# Patient Record
Sex: FEMALE | Race: WHITE | NOT HISPANIC OR LATINO | Employment: UNEMPLOYED | URBAN - METROPOLITAN AREA
[De-identification: names, ages, dates, MRNs, and addresses within clinical notes are randomized per-mention and may not be internally consistent; named-entity substitution may affect disease eponyms.]

---

## 2017-07-26 ENCOUNTER — HOSPITAL ENCOUNTER (EMERGENCY)
Facility: HOSPITAL | Age: 42
Discharge: HOME | End: 2017-07-26
Admitting: EMERGENCY MEDICINE
Payer: COMMERCIAL

## 2017-07-26 VITALS
RESPIRATION RATE: 16 BRPM | WEIGHT: 264.8 LBS | DIASTOLIC BLOOD PRESSURE: 76 MMHG | OXYGEN SATURATION: 96 % | TEMPERATURE: 98 F | HEIGHT: 66 IN | SYSTOLIC BLOOD PRESSURE: 158 MMHG | HEART RATE: 87 BPM | BODY MASS INDEX: 42.56 KG/M2

## 2017-07-26 DIAGNOSIS — G43.009 MIGRAINE WITHOUT AURA AND WITHOUT STATUS MIGRAINOSUS, NOT INTRACTABLE: Primary | ICD-10-CM

## 2017-07-26 PROCEDURE — 99283 EMERGENCY DEPT VISIT LOW MDM: CPT

## 2017-07-26 RX ORDER — ONDANSETRON 4 MG/1
4 TABLET, ORALLY DISINTEGRATING ORAL ONCE
Status: COMPLETED | OUTPATIENT
Start: 2017-07-26 | End: 2017-07-26

## 2017-07-26 RX ORDER — IBUPROFEN 600 MG/1
600 TABLET ORAL ONCE
Status: COMPLETED | OUTPATIENT
Start: 2017-07-26 | End: 2017-07-26

## 2017-07-26 RX ADMIN — IBUPROFEN 600 MG: 600 TABLET ORAL at 08:40

## 2017-07-26 RX ADMIN — ONDANSETRON 4 MG: 4 TABLET, ORALLY DISINTEGRATING ORAL at 08:40

## 2017-10-02 ENCOUNTER — TRANSCRIBE ORDERS (OUTPATIENT)
Dept: SLEEP CENTER | Facility: CLINIC | Age: 42
End: 2017-10-02

## 2017-10-02 DIAGNOSIS — G47.33 OSA (OBSTRUCTIVE SLEEP APNEA): Primary | ICD-10-CM

## 2017-10-19 ENCOUNTER — TRANSCRIBE ORDERS (OUTPATIENT)
Dept: SLEEP CENTER | Facility: CLINIC | Age: 42
End: 2017-10-19

## 2017-10-19 DIAGNOSIS — G47.33 OSA (OBSTRUCTIVE SLEEP APNEA): Primary | ICD-10-CM

## 2017-11-10 ENCOUNTER — APPOINTMENT (EMERGENCY)
Dept: RADIOLOGY | Facility: HOSPITAL | Age: 42
DRG: 197 | End: 2017-11-10
Payer: COMMERCIAL

## 2017-11-10 ENCOUNTER — APPOINTMENT (EMERGENCY)
Dept: NON INVASIVE DIAGNOSTICS | Facility: HOSPITAL | Age: 42
DRG: 197 | End: 2017-11-10
Payer: COMMERCIAL

## 2017-11-10 ENCOUNTER — APPOINTMENT (EMERGENCY)
Dept: ULTRASOUND IMAGING | Facility: HOSPITAL | Age: 42
DRG: 197 | End: 2017-11-10
Payer: COMMERCIAL

## 2017-11-10 ENCOUNTER — HOSPITAL ENCOUNTER (INPATIENT)
Facility: HOSPITAL | Age: 42
LOS: 5 days | Discharge: HOME | DRG: 197 | End: 2017-11-15
Attending: EMERGENCY MEDICINE | Admitting: ANESTHESIOLOGY
Payer: COMMERCIAL

## 2017-11-10 DIAGNOSIS — R77.8 ELEVATED TROPONIN: ICD-10-CM

## 2017-11-10 DIAGNOSIS — S42.301A RIGHT HUMERAL FRACTURE: ICD-10-CM

## 2017-11-10 DIAGNOSIS — I26.99 PULMONARY EMBOLUS (HCC): Primary | ICD-10-CM

## 2017-11-10 LAB
ALBUMIN SERPL BCP-MCNC: 2.8 G/DL (ref 3.5–5)
ALP SERPL-CCNC: 94 U/L (ref 46–116)
ALT SERPL W P-5'-P-CCNC: 16 U/L (ref 12–78)
ANION GAP SERPL CALCULATED.3IONS-SCNC: 12 MMOL/L (ref 4–13)
APTT PPP: 105 SECONDS (ref 23–35)
APTT PPP: 22 SECONDS (ref 23–35)
AST SERPL W P-5'-P-CCNC: 10 U/L (ref 5–45)
ATRIAL RATE: 123 BPM
BASOPHILS # BLD AUTO: 0.05 THOUSANDS/ΜL (ref 0–0.1)
BASOPHILS NFR BLD AUTO: 0 % (ref 0–1)
BILIRUB SERPL-MCNC: 0.2 MG/DL (ref 0.2–1)
BUN SERPL-MCNC: 12 MG/DL (ref 5–25)
CALCIUM SERPL-MCNC: 8.7 MG/DL (ref 8.3–10.1)
CHLORIDE SERPL-SCNC: 103 MMOL/L (ref 100–108)
CO2 SERPL-SCNC: 23 MMOL/L (ref 21–32)
CREAT SERPL-MCNC: 0.82 MG/DL (ref 0.6–1.3)
EOSINOPHIL # BLD AUTO: 0.18 THOUSAND/ΜL (ref 0–0.61)
EOSINOPHIL NFR BLD AUTO: 1 % (ref 0–6)
ERYTHROCYTE [DISTWIDTH] IN BLOOD BY AUTOMATED COUNT: 14.8 % (ref 11.6–15.1)
GFR SERPL CREATININE-BSD FRML MDRD: 88 ML/MIN/1.73SQ M
GLUCOSE SERPL-MCNC: 179 MG/DL (ref 65–140)
HCT VFR BLD AUTO: 41.6 % (ref 34.8–46.1)
HGB BLD-MCNC: 13.6 G/DL (ref 11.5–15.4)
INR PPP: 0.94 (ref 0.86–1.16)
LYMPHOCYTES # BLD AUTO: 2.73 THOUSANDS/ΜL (ref 0.6–4.47)
LYMPHOCYTES NFR BLD AUTO: 14 % (ref 14–44)
MCH RBC QN AUTO: 27.7 PG (ref 26.8–34.3)
MCHC RBC AUTO-ENTMCNC: 32.7 G/DL (ref 31.4–37.4)
MCV RBC AUTO: 85 FL (ref 82–98)
MONOCYTES # BLD AUTO: 1 THOUSAND/ΜL (ref 0.17–1.22)
MONOCYTES NFR BLD AUTO: 5 % (ref 4–12)
NEUTROPHILS # BLD AUTO: 15.6 THOUSANDS/ΜL (ref 1.85–7.62)
NEUTS SEG NFR BLD AUTO: 79 % (ref 43–75)
NRBC BLD AUTO-RTO: 0 /100 WBCS
NT-PROBNP SERPL-MCNC: 689 PG/ML
P AXIS: 57 DEGREES
PLATELET # BLD AUTO: 266 THOUSANDS/UL (ref 149–390)
PMV BLD AUTO: 10.1 FL (ref 8.9–12.7)
POTASSIUM SERPL-SCNC: 4.1 MMOL/L (ref 3.5–5.3)
PR INTERVAL: 148 MS
PROT SERPL-MCNC: 7.5 G/DL (ref 6.4–8.2)
PROTHROMBIN TIME: 12.8 SECONDS (ref 12.1–14.4)
QRS AXIS: 16 DEGREES
QRSD INTERVAL: 70 MS
QT INTERVAL: 320 MS
QTC INTERVAL: 458 MS
RBC # BLD AUTO: 4.91 MILLION/UL (ref 3.81–5.12)
SODIUM SERPL-SCNC: 138 MMOL/L (ref 136–145)
T WAVE AXIS: 49 DEGREES
TROPONIN I SERPL-MCNC: 0.17 NG/ML
TROPONIN I SERPL-MCNC: 0.22 NG/ML
TROPONIN I SERPL-MCNC: 0.39 NG/ML
VENTRICULAR RATE: 123 BPM
WBC # BLD AUTO: 19.86 THOUSAND/UL (ref 4.31–10.16)

## 2017-11-10 PROCEDURE — 85025 COMPLETE CBC W/AUTO DIFF WBC: CPT | Performed by: EMERGENCY MEDICINE

## 2017-11-10 PROCEDURE — 93970 EXTREMITY STUDY: CPT

## 2017-11-10 PROCEDURE — 71010 HB CHEST X-RAY 1 VIEW FRONTAL (PORTABLE): CPT

## 2017-11-10 PROCEDURE — 80053 COMPREHEN METABOLIC PANEL: CPT | Performed by: EMERGENCY MEDICINE

## 2017-11-10 PROCEDURE — 85730 THROMBOPLASTIN TIME PARTIAL: CPT | Performed by: EMERGENCY MEDICINE

## 2017-11-10 PROCEDURE — 93005 ELECTROCARDIOGRAM TRACING: CPT | Performed by: EMERGENCY MEDICINE

## 2017-11-10 PROCEDURE — 83880 ASSAY OF NATRIURETIC PEPTIDE: CPT | Performed by: EMERGENCY MEDICINE

## 2017-11-10 PROCEDURE — 99285 EMERGENCY DEPT VISIT HI MDM: CPT

## 2017-11-10 PROCEDURE — 85730 THROMBOPLASTIN TIME PARTIAL: CPT | Performed by: ANESTHESIOLOGY

## 2017-11-10 PROCEDURE — 36415 COLL VENOUS BLD VENIPUNCTURE: CPT | Performed by: EMERGENCY MEDICINE

## 2017-11-10 PROCEDURE — 96366 THER/PROPH/DIAG IV INF ADDON: CPT

## 2017-11-10 PROCEDURE — 96375 TX/PRO/DX INJ NEW DRUG ADDON: CPT

## 2017-11-10 PROCEDURE — 96365 THER/PROPH/DIAG IV INF INIT: CPT

## 2017-11-10 PROCEDURE — 85610 PROTHROMBIN TIME: CPT | Performed by: EMERGENCY MEDICINE

## 2017-11-10 PROCEDURE — 84484 ASSAY OF TROPONIN QUANT: CPT | Performed by: NURSE PRACTITIONER

## 2017-11-10 PROCEDURE — 84484 ASSAY OF TROPONIN QUANT: CPT | Performed by: EMERGENCY MEDICINE

## 2017-11-10 PROCEDURE — 93306 TTE W/DOPPLER COMPLETE: CPT

## 2017-11-10 RX ORDER — CLONAZEPAM 0.5 MG/1
0.5 TABLET ORAL DAILY
Status: DISCONTINUED | OUTPATIENT
Start: 2017-11-11 | End: 2017-11-15 | Stop reason: HOSPADM

## 2017-11-10 RX ORDER — CLONAZEPAM 0.5 MG/1
0.5 TABLET ORAL DAILY
COMMUNITY
End: 2020-08-03 | Stop reason: SDUPTHER

## 2017-11-10 RX ORDER — HEPARIN SODIUM 1000 [USP'U]/ML
9600 INJECTION, SOLUTION INTRAVENOUS; SUBCUTANEOUS ONCE
Status: COMPLETED | OUTPATIENT
Start: 2017-11-10 | End: 2017-11-10

## 2017-11-10 RX ORDER — LOSARTAN POTASSIUM 50 MG/1
50 TABLET ORAL 2 TIMES DAILY
Status: DISCONTINUED | OUTPATIENT
Start: 2017-11-10 | End: 2017-11-15 | Stop reason: HOSPADM

## 2017-11-10 RX ORDER — AMOXICILLIN AND CLAVULANATE POTASSIUM 500; 125 MG/1; MG/1
1 TABLET, FILM COATED ORAL EVERY 12 HOURS SCHEDULED
COMMUNITY
End: 2018-02-23 | Stop reason: ALTCHOICE

## 2017-11-10 RX ORDER — LOSARTAN POTASSIUM 25 MG/1
25 TABLET ORAL 2 TIMES DAILY
Status: DISCONTINUED | OUTPATIENT
Start: 2017-11-10 | End: 2017-11-10

## 2017-11-10 RX ORDER — HEPARIN SODIUM 10000 [USP'U]/100ML
3-30 INJECTION, SOLUTION INTRAVENOUS
Status: DISCONTINUED | OUTPATIENT
Start: 2017-11-10 | End: 2017-11-15 | Stop reason: HOSPADM

## 2017-11-10 RX ORDER — CHLORHEXIDINE GLUCONATE 0.12 MG/ML
15 RINSE ORAL EVERY 12 HOURS SCHEDULED
Status: CANCELLED | OUTPATIENT
Start: 2017-11-10

## 2017-11-10 RX ORDER — AMOXICILLIN AND CLAVULANATE POTASSIUM 500; 125 MG/1; MG/1
1 TABLET, FILM COATED ORAL EVERY 12 HOURS SCHEDULED
Status: DISCONTINUED | OUTPATIENT
Start: 2017-11-10 | End: 2017-11-15 | Stop reason: HOSPADM

## 2017-11-10 RX ORDER — HEPARIN SODIUM 1000 [USP'U]/ML
9600 INJECTION, SOLUTION INTRAVENOUS; SUBCUTANEOUS AS NEEDED
Status: DISCONTINUED | OUTPATIENT
Start: 2017-11-10 | End: 2017-11-15 | Stop reason: HOSPADM

## 2017-11-10 RX ORDER — HEPARIN SODIUM 1000 [USP'U]/ML
4800 INJECTION, SOLUTION INTRAVENOUS; SUBCUTANEOUS AS NEEDED
Status: DISCONTINUED | OUTPATIENT
Start: 2017-11-10 | End: 2017-11-15 | Stop reason: HOSPADM

## 2017-11-10 RX ORDER — MELATONIN
1000 WEEKLY
COMMUNITY
End: 2019-02-24

## 2017-11-10 RX ORDER — SODIUM CHLORIDE 9 MG/ML
75 INJECTION, SOLUTION INTRAVENOUS CONTINUOUS
Status: DISCONTINUED | OUTPATIENT
Start: 2017-11-10 | End: 2017-11-14

## 2017-11-10 RX ORDER — CITALOPRAM 40 MG/1
40 TABLET ORAL DAILY
COMMUNITY
End: 2020-04-17 | Stop reason: SDUPTHER

## 2017-11-10 RX ORDER — LOSARTAN POTASSIUM 50 MG/1
50 TABLET ORAL 2 TIMES DAILY
Status: DISCONTINUED | OUTPATIENT
Start: 2017-11-11 | End: 2017-11-10

## 2017-11-10 RX ORDER — LOSARTAN POTASSIUM 50 MG/1
25 TABLET ORAL 2 TIMES DAILY
COMMUNITY
End: 2019-02-24

## 2017-11-10 RX ORDER — MELATONIN
1000 WEEKLY
Status: DISCONTINUED | OUTPATIENT
Start: 2017-11-10 | End: 2017-11-13

## 2017-11-10 RX ORDER — CITALOPRAM 20 MG/1
40 TABLET ORAL DAILY
Status: DISCONTINUED | OUTPATIENT
Start: 2017-11-11 | End: 2017-11-15 | Stop reason: HOSPADM

## 2017-11-10 RX ADMIN — AMOXICILLIN AND CLAVULANATE POTASSIUM 1 TABLET: 500; 125 TABLET, FILM COATED ORAL at 20:18

## 2017-11-10 RX ADMIN — HEPARIN SODIUM 9600 UNITS: 1000 INJECTION, SOLUTION INTRAVENOUS; SUBCUTANEOUS at 15:46

## 2017-11-10 RX ADMIN — LOSARTAN POTASSIUM 50 MG: 50 TABLET, FILM COATED ORAL at 20:45

## 2017-11-10 RX ADMIN — HEPARIN SODIUM AND DEXTROSE 18 UNITS/KG/HR: 10000; 5 INJECTION INTRAVENOUS at 15:48

## 2017-11-10 RX ADMIN — SODIUM CHLORIDE 75 ML/HR: 0.9 INJECTION, SOLUTION INTRAVENOUS at 19:58

## 2017-11-10 NOTE — ED PROVIDER NOTES
History  Chief Complaint   Patient presents with    Shortness of Breath     Pt brought via EMS for evaluation of SOB and CP  Pt reports SOB w/ exertion, and CP with inhilation  Pt had arm surgery 3 weeks prior  Also c/o left leg swelling, b/l leg weakness, and n/v  All symptoms started 2 days prior to arrival     Chest Pain       History provided by:  Patient  Shortness of Breath   Severity:  Moderate  Onset quality:  Gradual  Duration:  2 days  Timing:  Constant  Progression:  Worsening  Chronicity:  New  Context: activity    Context comment:  Pt with surgery for fixing humeral fracture and started with left leg pain/swelling and with some chest tightness/SOB in past few days  Relieved by:  None tried  Worsened by:  Exertion  Associated symptoms: chest pain    Associated symptoms: no sore throat, no sputum production, no vomiting and no wheezing    Risk factors: obesity, recent surgery and tobacco use    Chest Pain   Associated symptoms: shortness of breath    Associated symptoms: not vomiting        Prior to Admission Medications   Prescriptions Last Dose Informant Patient Reported? Taking?   amoxicillin-clavulanate (AUGMENTIN) 500-125 mg per tablet 11/10/2017 at Unknown time  Yes Yes   Sig: Take 1 tablet by mouth every 12 (twelve) hours   cholecalciferol (VITAMIN D3) 1,000 units tablet Past Week at Unknown time  Yes Yes   Sig: Take 1,000 Units by mouth once a week   citalopram (CeleXA) 40 mg tablet 11/10/2017 at Unknown time  Yes Yes   Sig: Take 40 mg by mouth daily   clonazePAM (KlonoPIN) 0 5 mg tablet 11/10/2017 at Unknown time  Yes Yes   Sig: Take 0 5 mg by mouth daily   losartan (COZAAR) 50 mg tablet 11/10/2017 at Unknown time  Yes Yes   Sig: Take 25 mg by mouth 2 (two) times a day      Facility-Administered Medications: None       Past Medical History:   Diagnosis Date    Anxiety     Depression     Hypertension        History reviewed  No pertinent surgical history  History reviewed   No pertinent family history  I have reviewed and agree with the history as documented  Social History   Substance Use Topics    Smoking status: Current Every Day Smoker     Packs/day: 0 50     Types: Cigarettes    Smokeless tobacco: Never Used    Alcohol use No        Review of Systems   HENT: Negative for sore throat  Respiratory: Positive for shortness of breath  Negative for sputum production and wheezing  Cardiovascular: Positive for chest pain  Gastrointestinal: Negative for vomiting  All other systems reviewed and are negative  Physical Exam  ED Triage Vitals   Temperature Pulse Respirations Blood Pressure SpO2   11/10/17 1250 11/10/17 1240 11/10/17 1240 11/10/17 1240 11/10/17 1240   98 °F (36 7 °C) (!) 126 21 137/70 95 %      Temp Source Heart Rate Source Patient Position - Orthostatic VS BP Location FiO2 (%)   11/10/17 1250 11/10/17 1240 11/10/17 1240 11/10/17 1240 --   Oral Monitor Sitting Left arm       Pain Score       --                  Orthostatic Vital Signs  Vitals:    11/10/17 1600 11/10/17 1630 11/10/17 1700 11/10/17 1730   BP: 135/77 133/81 122/87 131/64   Pulse: (!) 119 (!) 113 (!) 114 (!) 114   Patient Position - Orthostatic VS: Lying Lying  Lying       Physical Exam   Constitutional: She is oriented to person, place, and time  She appears well-developed and well-nourished  No distress  HENT:   Head: Normocephalic and atraumatic  Nose: Nose normal    Mouth/Throat: Oropharynx is clear and moist    Eyes: Conjunctivae and EOM are normal  Pupils are equal, round, and reactive to light  Neck: Normal range of motion  Neck supple  Cardiovascular: Regular rhythm, normal heart sounds and intact distal pulses  Tachycardia present  Pulmonary/Chest: Effort normal and breath sounds normal  No respiratory distress  Abdominal: Soft  Bowel sounds are normal    Musculoskeletal: Normal range of motion  She exhibits edema (left calf swelling)     Right arm in cast with ex-fix   Neurological: She is alert and oriented to person, place, and time  No cranial nerve deficit or sensory deficit  Skin: Skin is warm and dry  No rash noted  She is not diaphoretic  No erythema  Psychiatric: She has a normal mood and affect  Nursing note and vitals reviewed  ED Medications  Medications   heparin (porcine) 25,000 units in 250 mL infusion (premix) (18 Units/kg/hr × 120 kg (Order-Specific) Intravenous New Bag 11/10/17 1468)   heparin (porcine) injection 9,600 Units (not administered)   heparin (porcine) injection 4,800 Units (not administered)   heparin (porcine) injection 9,600 Units (9,600 Units Intravenous Given 11/10/17 5866)       Diagnostic Studies  Results Reviewed     Procedure Component Value Units Date/Time    APTT [[id number]]     Lab Status:  No result Specimen:  Blood     B-type natriuretic peptide [[id number]]  (Abnormal) Collected:  11/10/17 1258    Lab Status:  Final result Specimen:  Blood from Arm, Left Updated:  11/10/17 1329     NT-proBNP 689 (H) pg/mL     Troponin I [[id number]]  (Abnormal) Collected:  11/10/17 1258    Lab Status:  Final result Specimen:  Blood from Arm, Left Updated:  11/10/17 1325     Troponin I 0 39 (H) ng/mL     Narrative:         [employer] Chemistry analyzer 99% cutoff is > 0 04 ng/mL in network labs    o cTnI 99% cutoff is useful only when applied to patients in the clinical setting of myocardial ischemia  o cTnI 99% cutoff should be interpreted in the context of clinical history, ECG findings and possibly cardiac imaging to establish correct diagnosis  o cTnI 99% cutoff may be suggestive but clearly not indicative of a coronary event without the clinical setting of myocardial ischemia      Comprehensive metabolic panel [[id number]]  (Abnormal) Collected:  11/10/17 1258    Lab Status:  Final result Specimen:  Blood from Arm, Left Updated:  11/10/17 1322     Sodium 138 mmol/L      Potassium 4 1 mmol/L      Chloride 103 mmol/L      CO2 23 mmol/L      Anion Gap 12 mmol/L BUN 12 mg/dL      Creatinine 0 82 mg/dL      Glucose 179 (H) mg/dL      Calcium 8 7 mg/dL      AST 10 U/L      ALT 16 U/L      Alkaline Phosphatase 94 U/L      Total Protein 7 5 g/dL      Albumin 2 8 (L) g/dL      Total Bilirubin 0 20 mg/dL      eGFR 88 ml/min/1 73sq m     Narrative:         National Kidney Disease Education Program recommendations are as follows:  GFR calculation is accurate only with a steady state creatinine  Chronic Kidney disease less than 60 ml/min/1 73 sq  meters  Kidney failure less than 15 ml/min/1 73 sq  meters  Protime-INR [[id number]]  (Normal) Collected:  11/10/17 1259    Lab Status:  Final result Specimen:  Blood from Arm, Left Updated:  11/10/17 1322     Protime 12 8 seconds      INR 0 94    APTT [[id number]]  (Abnormal) Collected:  11/10/17 1259    Lab Status:  Final result Specimen:  Blood from Arm, Left Updated:  11/10/17 1322     PTT 22 (L) seconds     Narrative: Therapeutic Heparin Range = 60-90 seconds    CBC and differential [[id number]]  (Abnormal) Collected:  11/10/17 1258    Lab Status:  Final result Specimen:  Blood from Arm, Left Updated:  11/10/17 1310     WBC 19 86 (H) Thousand/uL      RBC 4 91 Million/uL      Hemoglobin 13 6 g/dL      Hematocrit 41 6 %      MCV 85 fL      MCH 27 7 pg      MCHC 32 7 g/dL      RDW 14 8 %      MPV 10 1 fL      Platelets 185 Thousands/uL      nRBC 0 /100 WBCs      Neutrophils Relative 79 (H) %      Lymphocytes Relative 14 %      Monocytes Relative 5 %      Eosinophils Relative 1 %      Basophils Relative 0 %      Neutrophils Absolute 15 60 (H) Thousands/µL      Lymphocytes Absolute 2 73 Thousands/µL      Monocytes Absolute 1 00 Thousand/µL      Eosinophils Absolute 0 18 Thousand/µL      Basophils Absolute 0 05 Thousands/µL                  XR chest 1 view portable   Final Result by Pauline Rodriguez DO (11/10 1423)      No acute pulmonary disease           Workstation performed: [id number]         VAS lower limb venous duplex study, complete bilateral    (Results Pending)              Procedures  ECG 12 Lead Documentation  Date/Time: 11/10/2017 2:36 PM  Performed by: Blanca Quintero  Authorized by: Blanca Quintero     Indications / Diagnosis:  Dyspnea  ECG reviewed by me, the ED Provider: yes    Patient location:  ED  Rate:     ECG rate:  123    ECG rate assessment: normal    Rhythm:     Rhythm: sinus tachycardia    ST segments:     ST segments:  Normal  T waves:     T waves: normal             Phone Contacts  ED Phone Contact    ED Course  ED Course                                MDM  Number of Diagnoses or Management Options  Elevated troponin: new and requires workup  Pulmonary embolus [facility]): new and requires workup     Amount and/or Complexity of Data Reviewed  Clinical lab tests: ordered and reviewed  Tests in the radiology section of CPT®: ordered and reviewed  Independent visualization of images, tracings, or specimens: yes    Risk of Complications, Morbidity, and/or Mortality  Presenting problems: high    Patient Progress  Patient progress: stable    CritCare Time    Disposition  Final diagnoses:   Pulmonary embolus ([facility] [address] )   Elevated troponin     Time reflects when diagnosis was documented in both MDM as applicable and the Disposition within this note     Time User Action Codes Description Comment    11/10/2017  5:26 PM Kate PLAZA Add [I26 99] Pulmonary embolus ([facility] [address] )     11/10/2017  5:27 PM Elkin Marcum [address], [address] [R74 8] Elevated troponin       ED Disposition     ED Disposition Condition Comment    Admit  Case was discussed with [facility] and the patient's admission status was agreed to be Admission Status: inpatient status to the service of   [facility]   Follow-up Information    None       Patient's Medications   Discharge Prescriptions    No medications on file     No discharge procedures on file      ED Provider  Electronically Signed by           Macy Ernst MD  11/10/17 5628

## 2017-11-11 PROBLEM — I26.99 PULMONARY EMBOLUS (HCC): Status: ACTIVE | Noted: 2017-11-11

## 2017-11-11 PROBLEM — S42.301A RIGHT HUMERAL FRACTURE: Status: ACTIVE | Noted: 2017-11-11

## 2017-11-11 PROBLEM — I82.409 ACUTE DEEP VEIN THROMBOSIS (DVT) OF LOWER EXTREMITY (HCC): Status: ACTIVE | Noted: 2017-11-11

## 2017-11-11 PROBLEM — F32.A DEPRESSION: Status: ACTIVE | Noted: 2017-11-11

## 2017-11-11 PROBLEM — Z72.0 TOBACCO ABUSE: Status: ACTIVE | Noted: 2017-11-11

## 2017-11-11 PROBLEM — I10 HTN (HYPERTENSION): Status: ACTIVE | Noted: 2017-11-11

## 2017-11-11 LAB
ALBUMIN SERPL BCP-MCNC: 2.3 G/DL (ref 3.5–5)
ALP SERPL-CCNC: 76 U/L (ref 46–116)
ALT SERPL W P-5'-P-CCNC: 13 U/L (ref 12–78)
ANION GAP SERPL CALCULATED.3IONS-SCNC: 9 MMOL/L (ref 4–13)
APTT PPP: 109 SECONDS (ref 23–35)
APTT PPP: 50 SECONDS (ref 23–35)
APTT PPP: 51 SECONDS (ref 23–35)
AST SERPL W P-5'-P-CCNC: 12 U/L (ref 5–45)
BASOPHILS # BLD AUTO: 0.08 THOUSANDS/ΜL (ref 0–0.1)
BASOPHILS NFR BLD AUTO: 1 % (ref 0–1)
BILIRUB SERPL-MCNC: 0.4 MG/DL (ref 0.2–1)
BUN SERPL-MCNC: 12 MG/DL (ref 5–25)
CALCIUM SERPL-MCNC: 8 MG/DL (ref 8.3–10.1)
CHLORIDE SERPL-SCNC: 105 MMOL/L (ref 100–108)
CO2 SERPL-SCNC: 26 MMOL/L (ref 21–32)
CREAT SERPL-MCNC: 0.85 MG/DL (ref 0.6–1.3)
EOSINOPHIL # BLD AUTO: 0.38 THOUSAND/ΜL (ref 0–0.61)
EOSINOPHIL NFR BLD AUTO: 3 % (ref 0–6)
ERYTHROCYTE [DISTWIDTH] IN BLOOD BY AUTOMATED COUNT: 15.2 % (ref 11.6–15.1)
GFR SERPL CREATININE-BSD FRML MDRD: 84 ML/MIN/1.73SQ M
GLUCOSE SERPL-MCNC: 140 MG/DL (ref 65–140)
HCT VFR BLD AUTO: 35.4 % (ref 34.8–46.1)
HGB BLD-MCNC: 11.3 G/DL (ref 11.5–15.4)
LYMPHOCYTES # BLD AUTO: 5.45 THOUSANDS/ΜL (ref 0.6–4.47)
LYMPHOCYTES NFR BLD AUTO: 37 % (ref 14–44)
MAGNESIUM SERPL-MCNC: 1.8 MG/DL (ref 1.6–2.6)
MCH RBC QN AUTO: 27.9 PG (ref 26.8–34.3)
MCHC RBC AUTO-ENTMCNC: 31.9 G/DL (ref 31.4–37.4)
MCV RBC AUTO: 87 FL (ref 82–98)
MONOCYTES # BLD AUTO: 0.72 THOUSAND/ΜL (ref 0.17–1.22)
MONOCYTES NFR BLD AUTO: 5 % (ref 4–12)
NEUTROPHILS # BLD AUTO: 7.99 THOUSANDS/ΜL (ref 1.85–7.62)
NEUTS SEG NFR BLD AUTO: 54 % (ref 43–75)
NRBC BLD AUTO-RTO: 0 /100 WBCS
PLATELET # BLD AUTO: 243 THOUSANDS/UL (ref 149–390)
PMV BLD AUTO: 10.6 FL (ref 8.9–12.7)
POTASSIUM SERPL-SCNC: 3.8 MMOL/L (ref 3.5–5.3)
PROT SERPL-MCNC: 6.3 G/DL (ref 6.4–8.2)
RBC # BLD AUTO: 4.05 MILLION/UL (ref 3.81–5.12)
SODIUM SERPL-SCNC: 140 MMOL/L (ref 136–145)
TROPONIN I SERPL-MCNC: 0.14 NG/ML
WBC # BLD AUTO: 14.86 THOUSAND/UL (ref 4.31–10.16)

## 2017-11-11 PROCEDURE — 85730 THROMBOPLASTIN TIME PARTIAL: CPT | Performed by: NURSE PRACTITIONER

## 2017-11-11 PROCEDURE — 83735 ASSAY OF MAGNESIUM: CPT | Performed by: NURSE PRACTITIONER

## 2017-11-11 PROCEDURE — 84484 ASSAY OF TROPONIN QUANT: CPT | Performed by: NURSE PRACTITIONER

## 2017-11-11 PROCEDURE — 80053 COMPREHEN METABOLIC PANEL: CPT | Performed by: NURSE PRACTITIONER

## 2017-11-11 PROCEDURE — 93005 ELECTROCARDIOGRAM TRACING: CPT | Performed by: NURSE PRACTITIONER

## 2017-11-11 PROCEDURE — 85025 COMPLETE CBC W/AUTO DIFF WBC: CPT | Performed by: NURSE PRACTITIONER

## 2017-11-11 PROCEDURE — 85730 THROMBOPLASTIN TIME PARTIAL: CPT | Performed by: INTERNAL MEDICINE

## 2017-11-11 RX ADMIN — SODIUM CHLORIDE 75 ML/HR: 0.9 INJECTION, SOLUTION INTRAVENOUS at 20:29

## 2017-11-11 RX ADMIN — HEPARIN SODIUM 4800 UNITS: 1000 INJECTION, SOLUTION INTRAVENOUS; SUBCUTANEOUS at 12:52

## 2017-11-11 RX ADMIN — HEPARIN SODIUM AND DEXTROSE 18 UNITS/KG/HR: 10000; 5 INJECTION INTRAVENOUS at 20:23

## 2017-11-11 RX ADMIN — HEPARIN SODIUM AND DEXTROSE 16 UNITS/KG/HR: 10000; 5 INJECTION INTRAVENOUS at 03:55

## 2017-11-11 RX ADMIN — CITALOPRAM HYDROBROMIDE 40 MG: 20 TABLET ORAL at 08:06

## 2017-11-11 RX ADMIN — AMOXICILLIN AND CLAVULANATE POTASSIUM 1 TABLET: 500; 125 TABLET, FILM COATED ORAL at 08:07

## 2017-11-11 RX ADMIN — SODIUM CHLORIDE 75 ML/HR: 0.9 INJECTION, SOLUTION INTRAVENOUS at 08:19

## 2017-11-11 RX ADMIN — HEPARIN SODIUM AND DEXTROSE 18 UNITS/KG/HR: 10000; 5 INJECTION INTRAVENOUS at 19:13

## 2017-11-11 RX ADMIN — LOSARTAN POTASSIUM 50 MG: 50 TABLET, FILM COATED ORAL at 08:06

## 2017-11-11 RX ADMIN — CLONAZEPAM 0.5 MG: 0.5 TABLET ORAL at 08:06

## 2017-11-11 RX ADMIN — AMOXICILLIN AND CLAVULANATE POTASSIUM 1 TABLET: 500; 125 TABLET, FILM COATED ORAL at 20:25

## 2017-11-11 RX ADMIN — HEPARIN SODIUM 4800 UNITS: 1000 INJECTION, SOLUTION INTRAVENOUS; SUBCUTANEOUS at 19:13

## 2017-11-11 RX ADMIN — LOSARTAN POTASSIUM 50 MG: 50 TABLET, FILM COATED ORAL at 17:59

## 2017-11-11 NOTE — CASE MANAGEMENT
[address] [facility] in the [facility] by [address] for 2017  Network Utilization Review Department  Phone: [phone]; Fax [phone]  ATTENTION: The Network Utilization Review Department is now centralized for our 7 Facilities  Make a note that we have a new phone and fax numbers for our Department  Please call with any questions or concerns to [phone] and carefully follow the prompts so that you are directed to the right person  All voicemails are confidential  Fax any determinations, approvals, denials, and requests for initial or continue stay review clinical to [phone]  Due to HIGH CALL volume, it would be easier if you could please send faxed requests to expedite your requests and in part, help us provide discharge notifications faster  Initial Clinical Review    Admission: Date/Time/Statement: 11/10/17 @ 1728     Orders Placed This Encounter   Procedures    Inpatient Admission (expected length of stay for this patient is greater than two midnights)     Standing Status:   Standing     Number of Occurrences:   1     Order Specific Question:   Admitting Physician     Answer:   Monserrat Cintron     Order Specific Question:   Level of Care     Answer:   Level 1 Stepdown [13]     Order Specific Question:   Estimated length of stay     Answer:   More than 2 Midnights     Order Specific Question:   Certification     Answer:   I certify that inpatient services are medically necessary for this patient for a duration of greater than two midnights  See H&P and MD Progress Notes for additional information about the patient's course of treatment       ED: Date/Time/Mode of Arrival:   ED Arrival Information     Expected Arrival Acuity Means of Arrival Escorted By Service Admission Type    - 11/10/2017 12:34 [facility] [address] Emergency    Arrival Complaint    SOB        Chief Complaint:   Chief Complaint   Patient presents with    Shortness of Breath     Pt brought via EMS for evaluation of SOB and CP  Pt reports SOB w/ exertion, and CP with inhilation  Pt had arm surgery 3 weeks prior  Also c/o left leg swelling, b/l leg weakness, and n/v  All symptoms started 2 days prior to arrival     Chest Pain     History of Illness: Joaquín Goyal is a 36 y o  female who presents with complaints of shortness of breath and chest pain  The patient underwent surgery for a fractured humerus 3 weeks ago and has now started having left leg pain and swelling with associated chest tightness and shortness of breath over the past few days  She has a past medical history of hypertension, depression, and anxiety  She currently has an external fixator on her right arm  Lower extremity doppler done in the ED showed a DVT, and an echo showed an EF of 55% and a dilated right ventricle    CT scan to formally rule out a PE could not be done given the fixator on her arm, however given the confirmed DVT,dilated right ventricle on echo, and her constellation of symptoms, she is being admitted to the stepdown unit and will be treated for a PE        ED Vital Signs:   ED Triage Vitals   Temperature Pulse Respirations Blood Pressure SpO2   11/10/17 1250 11/10/17 1240 11/10/17 1240 11/10/17 1240 11/10/17 1240   98 °F (36 7 °C) (!) 126 21 137/70 95 %      Temp Source Heart Rate Source Patient Position - Orthostatic VS BP Location FiO2 (%)   11/10/17 1250 11/10/17 1240 11/10/17 1240 11/10/17 1240 --   Oral Monitor Sitting Left arm       Pain Score       11/10/17 1943       4        Wt Readings from Last 1 Encounters:   11/11/17 123 kg (270 lb 1 oz)     Vital Signs (abnormal):   11/11/17 0400  --  85   26  117/67  86  92 %  None (Room air)  Lying   11/10/17 2319  97 7 °F (36 5 °C)  104   28  118/65  79  93 %  None (Room air)  Lying   11/10/17 1943  97 9 °F (36 6 °C)   107  16  110/57  80  96 %  None (Room air)  Lying   11/10/17 1830  --   112  16  110/63  --  97 % None (Room air)  Sitting   11/10/17 1800  --   114   11  111/58  --  97 %  None (Room air)  Sitting   11/10/17 1730  --   114  17  131/64  --  94 %  None (Room air)  Lying   11/10/17 1700  --   114  17  122/87  --  96 %  None (Room air)  --   11/10/17 1630  --   113  15  133/81  --  97 %  None (Room air)  Lying   11/10/17 1600  --   119  14  135/77  --  97 %  None (Room air)  Lying   11/10/17 1500  --   120  19  112/70  --  97 %  None (Room air)  Lying   11/10/17 1430  --   120  18  102/56  --  95 %  None (Room air)  Lying   11/10/17 1400  --   123  16  123/82  --  96 %  None (Room air)  Lying   11/10/17 1240  --   126  21  137/70  --  95 %  None (Room air)  Sitting     Abnormal Labs:  Glucose 179  Justin 8 0  Total protein 6 3  Albumin 2 8, 2 3  Trop - 0 39, 0 17, 0 22, 0 14    WBC - 19 86, 14 86  Hgb 11 3  PTT - 22, 105, 109, 50    Diagnostic Test Results:     VAS lower limb - Evidence of acute occlusive deep vein thrombosis within the proximal section of the gastroc veins  Thrombus protrudes into the juction with the popliteal vein  No evidence of superficial thrombophlebitis noted  EKG - Sinus tachycardia  Low voltage QRS  Cannot rule out Anterior infarct (cited on or before 10-NOV-2017)  Abnormal ECG    ED Treatment:   Medication Administration from 11/10/2017 1234 to 11/10/2017 1941       Date/Time Order Dose Route Action     11/10/2017 1546 heparin (porcine) injection 9,600 Units 9,600 Units Intravenous Given     11/10/2017 1548 heparin (porcine) 25,000 units in 250 mL infusion (premix) 18 Units/kg/hr Intravenous New Bag        Past Medical/Surgical History:    Active Ambulatory Problems     Diagnosis Date Noted    No Active Ambulatory Problems     Resolved Ambulatory Problems     Diagnosis Date Noted    No Resolved Ambulatory Problems     Past Medical History:   Diagnosis Date    Anxiety     Depression     Hypertension        Admitting Diagnosis: Pulmonary embolus ([facility] [address] ) [I26 99]  SOB (shortness of breath) [R06 02]  Elevated troponin [R74 8]    Age/Sex: 36 y o  female    Assessment/Plan:   1  LLE DVT  2  Presumed PE  3  Right humeral fracture s/p surgery with external fixator  4  HTN  5  Tobacco abuse  6  Depression      Plan: THe patient is a 36year old female s/p right humeral fracture repair 3 weeks ago with LLE DVT and presumed PE                  Neuro:   Depression-continue antidepressant  [facility] ICU  Sleep hygiene                 CV:   RV strain-2/2 PE, continue heparin drip  HTN-stable, continue losartan  NSTEMI type 2, 2/2 presumed PE-will trend troponins and follow EKG, on heparin drip                  Lung:   Presumed PE-unable to scan given external fixator, but high probability given confirmed LE DVT and RV strain seen on echo, along with her complaints of SOB and chest pain-heparin drip started, will need conversion to oral agent prior to discharge, monitor for hypoxia                   GI:   Cardiac diet                 FEN:   Monitor lytes and replace as needed                 : Monitor renal indices and I and Os                 ID:   Continue augmentin-was on post-operatively, monitor WBC and fever curve                 Heme:   Heparin drip, monitor coagulation studies and hemoglobin                 Endo:   Monitor glucose on daily BMP                 Msk/Skin:   Right humeral fracture s/p repair and external fixator-follows with [facility] as an outpatient, continue augmentin, pain relief as needed                 Disposition:   Will place in inpatient status as she will require a greater than 2 midnight LOS  She will be admitted to the stepdown unit  She will be a full code      VTE Pharmacologic Prophylaxis: Heparin  VTE Mechanical Prophylaxis: sequential compression device     Invasive lines and devices:       Invasive Devices            Peripheral Intravenous Line                     Peripheral IV 11/10/17 Left Hand less than 1 day                Code Status: No Order  Given critical illness, patient length of stay will require greater than two midnights  Admission Orders:  Scheduled Meds:   amoxicillin-clavulanate 1 tablet Oral Q12H [address]   cholecalciferol 1,000 Units Oral Weekly   citalopram 40 mg Oral Daily   clonazePAM 0 5 mg Oral Daily   losartan 50 mg Oral BID     Continuous Infusions:   heparin (porcine) 3-30 Units/kg/hr (Order-Specific) Last Rate: 16 Units/kg/hr (11/11/17 1240)   sodium chloride 75 mL/hr Last Rate: 75 mL/hr (11/11/17 0819)     PRN Meds:     heparin (porcine) x1    pneumococcal 23-valent polysaccharide vaccine    ICU  Diet Cardiac step 1  To MS now Tele  Nasal cannula   SCDs  Up with assist  PTT q 6 hrs  ________________________________________  11/11 Critical Care Progress Note  Assessment and Plan:   Principal Problem:    Acute deep vein thrombosis (DVT) of lower extremity (HCC)  Active Problems:    Presumed pulmonary embolus    HTN (hypertension)    Tobacco abuse    Depression    Right humeral fracture s/p repair with external fixator  Resolved Problems:    * No resolved hospital problems  *      Neuro:   Depression-continue antidepressant  [facility] ICU  Sleep hygiene     CV:   RV strain-2/2 PE, continue heparin drip  HTN-stable, continue losartan  NSTEMi type 2-2/2 presumed PE-continue to trend troponins and follow EKG, continue heparin drip     Pulm:   Presumed PE-unable to scan given external fixator, but high probability given confirmed LE DVT and RV strain seen on echo, along with her complaints of SOB and chest pain-continue heparin drip, monitor for hypoxia     GI:   Cardiac diet     :    Monitor renal indices and I and Os     F/E/N:   Monitor lytes and replace as needed     ID:   Continue augmentin-was on post-operatively, monitor WBC and fever curve     Heme:   Continue heparin drip     Endo:   Monitor glucose on daily BMP                Msk/Skin:   Right humeral fracture s/p repair and external fixator-follows with [facility] as an outpatient, continue augmentin, pain relief as needed     Disposition:   Maintain stepdown level of care     Code Status: Level 1 - Full Code  Chief Complaint: SOB  24 Hour Events:   Admitted yesterday with SOB and chest pain  Was found to have a LE DVT and RV strain on echo  Unable to scan for PE given the external fixator on her right arm    Heparin drip started for presumed PE

## 2017-11-11 NOTE — PROGRESS NOTES
Transfer Note - ICU Transfer to SD/MS tele   Paulina Lane 36 y o  female MRN: [id number]  [address] [facility]   Unit/Bed#:  Encounter: [id number]    Code Status: Level 1 - Full Code  POA:    POLST:      Reason for ICU adm:  Lower extremity DVT, possible pulmonary embolus    Active problems:   Principal Problem:    Acute deep vein thrombosis (DVT) of lower extremity (HCC)  Active Problems:    Presumed pulmonary embolus    HTN (hypertension)    Tobacco abuse    Depression    Right humeral fracture s/p repair with external fixator  Resolved Problems:    * No resolved hospital problems  *      Consultants:  None    History of Present Illness:  48-year-old female presents with complaints of shortness of breath and chest pain over past few days  Notable recent history of having external fixator placed on fractured right humerus 3 weeks ago for an injury that was sustained 2 years ago  Past medical history significant for hypertension, depression, anxiety  Summary of clinical course:  ER evaluation revealed lower extremity DVT  Echocardiogram revealed an ejection fraction of 55% and a dilated right ventricle  Patient was unable to have CT angio for PE given the fixator on her arm  Pulmonary embolus is presumed based on her constellation of symptoms and her right heart strain  She was admitted to step-down for same  Placed on anticoagulation  Augmentin continued from home medications  Prescribed postop  Recent or scheduled procedures:  None    Outstanding/pending diagnostics:  None    Cultures:  None       Mobilization Plan:  Out of bed as tolerated    Nutrition Plan:   Tolerating cardiac prudent diet    Discharge Plan:   Patient should be ready for discharge to home after the anticoagulation established    Initial Physical Therapy Recommendations:  Not applicable  Initial Occupational Therapy Recommendations:  Nonapplicable  Initial /Plan: Pending     Spoke with Dr Esther Scales  regarding transfer  Please call Critical Care Medicine with any questions or concerns  Portions of the record may have been created with voice recognition software  Occasional wrong word or "sound a like" substitutions may have occurred due to the inherent limitations of voice recognition software  Read the chart carefully and recognize, using context, where substitutions have occurred      PEGGY Dumas

## 2017-11-11 NOTE — H&P
History and Physical - Critical Care  Dali Lane 36 y o  female MRN: [id number]  Unit/Bed#: ED 24 Encounter: [id number]     Reason for Admission / Chief Complaint:  Shortness of breath     History of Present Illness:  Yeny Alvarez is a 36 y o  female who presents with complaints of shortness of breath and chest pain  The patient underwent surgery for a fractured humerus 3 weeks ago and has now started having left leg pain and swelling with associated chest tightness and shortness of breath over the past few days  She has a past medical history of hypertension, depression, and anxiety  She currently has an external fixator on her right arm  Lower extremity doppler done in the ED showed a DVT, and an echo showed an EF of 55% and a dilated right ventricle  CT scan to formally rule out a PE could not be done given the fixator on her arm, however given the confirmed DVT,dilated right ventricle on echo, and her constellation of symptoms, she is being admitted to the stepdown unit and will be treated for a PE  History obtained from the patient  Past Medical History:  Past Medical History:   Diagnosis Date    Anxiety     Depression     Hypertension         Past Surgical History:  History reviewed  No pertinent surgical history  Past Family History:  History reviewed  No pertinent family history       Social History:  History   Smoking Status    Current Every Day Smoker    Packs/day: 0 50    Types: Cigarettes   Smokeless Tobacco    Never Used     History   Alcohol Use No     History   Drug Use No     Marital Status: Single     Medications:  Current Facility-Administered Medications   Medication Dose Route Frequency    heparin (porcine) 25,000 units in 250 mL infusion (premix)  3-30 Units/kg/hr (Order-Specific) Intravenous Titrated    heparin (porcine) injection 4,800 Units  4,800 Units Intravenous PRN    heparin (porcine) injection 9,600 Units  9,600 Units Intravenous PRN     Home medications:  Prior to Admission medications    Medication Sig Start Date End Date Taking? Authorizing Provider   amoxicillin-clavulanate (AUGMENTIN) 500-125 mg per tablet Take 1 tablet by mouth every 12 (twelve) hours   Yes Historical Provider, MD   cholecalciferol (VITAMIN D3) 1,000 units tablet Take 1,000 Units by mouth once a week   Yes Historical Provider, MD   citalopram (CeleXA) 40 mg tablet Take 40 mg by mouth daily   Yes Historical Provider, MD   clonazePAM (KlonoPIN) 0 5 mg tablet Take 0 5 mg by mouth daily   Yes Historical Provider, MD   losartan (COZAAR) 50 mg tablet Take 25 mg by mouth 2 (two) times a day   Yes Historical Provider, MD     Allergies: Allergies   Allergen Reactions    Buspar [Buspirone] Rash    Other Rash     Surgical tape        ROS:   Review of Systems   Constitutional: Negative for chills and fever  HENT: Negative  Eyes: Negative  Respiratory: Positive for shortness of breath  Negative for cough, wheezing and stridor  Cardiovascular: Positive for chest pain and leg swelling  Negative for palpitations  Left leg swelling   Gastrointestinal: Negative for abdominal distention, abdominal pain, blood in stool, diarrhea, nausea and vomiting  Endocrine: Negative  Genitourinary: Negative for dysuria, flank pain, frequency and urgency  Musculoskeletal: Negative  Skin: Negative for rash and wound  Allergic/Immunologic: Negative  Neurological: Negative for dizziness, syncope, facial asymmetry, weakness, light-headedness, numbness and headaches  Hematological: Negative for adenopathy  Does not bruise/bleed easily  Psychiatric/Behavioral: Negative           Vitals:  Vitals:    11/10/17 1700 11/10/17 1730 11/10/17 1800 11/10/17 1830   BP: 122/87 131/64 111/58 110/63   Pulse: (!) 114 (!) 114 (!) 114 (!) 112   Resp:  (!) 11 16   Temp:       TempSrc:       SpO2: 96% 94% 97% 97%   Weight:       Height:         Temperature:   Temp (24hrs), Av °F (36 7 °C), Min:98 °F (36 7 °C), Max:98 °F (36 7 °C)    Current: Temperature: 98 °F (36 7 °C)     Weights:   IBW: 59 3 kg  Body mass index is 42 77 kg/m²  Hemodynamic Monitoring:  N/A     Non-Invasive/Invasive Ventilation Settings:  Respiratory    Lab Data (Last 4 hours)    None         O2/Vent Data (Last 4 hours)    None              No results found for: PHART, ZZO1AIH, PO2ART, JYK7YLT, L7GUFXTN, BEART, SOURCE  SpO2: SpO2: 97 %     Physical Exam:  Physical Exam   Constitutional: She is oriented to person, place, and time  She appears well-developed and well-nourished  No distress  HENT:   Head: Normocephalic and atraumatic  Eyes: Conjunctivae are normal  Pupils are equal, round, and reactive to light  Neck: Normal range of motion  Neck supple  No JVD present  No thyromegaly present  Cardiovascular: Regular rhythm and normal heart sounds  Tachycardia present  Exam reveals no gallop and no friction rub  No murmur heard  Pulmonary/Chest: Effort normal and breath sounds normal  No respiratory distress  Abdominal: Soft  Bowel sounds are normal  She exhibits no distension  There is no tenderness  Genitourinary:   Genitourinary Comments: Deferred   Musculoskeletal: She exhibits no edema  +1 LLE edema    Right arm with external fixator   Lymphadenopathy:     She has no cervical adenopathy  Neurological: She is alert and oriented to person, place, and time  No cranial nerve deficit  Skin: Skin is warm and dry  No rash noted  No erythema  Psychiatric: She has a normal mood and affect          Labs:    Results from last 7 days  Lab Units 11/10/17  1258   WBC Thousand/uL 19 86*   HEMOGLOBIN g/dL 13 6   HEMATOCRIT % 41 6   PLATELETS Thousands/uL 266   NEUTROS PCT % 79*   MONOS PCT % 5      Results from last 7 days  Lab Units 11/10/17  1258   SODIUM mmol/L 138   POTASSIUM mmol/L 4 1   CHLORIDE mmol/L 103   CO2 mmol/L 23   BUN mg/dL 12   CREATININE mg/dL 0 82   CALCIUM mg/dL 8 7   TOTAL PROTEIN g/dL 7 5 BILIRUBIN TOTAL mg/dL 0 20   ALK PHOS U/L 94   ALT U/L 16   AST U/L 10   GLUCOSE RANDOM mg/dL 179*                Results from last 7 days  Lab Units 11/10/17  1259   INR  0 94   PTT seconds 22*           0  Lab Value Date/Time   TROPONINI 0 39 (H) 11/10/2017 1258        Imaging:   XR chest 1 view portable   Final Result      No acute pulmonary disease  Workstation performed: [id number]         VAS lower limb venous duplex study, complete bilateral    (Results Pending)      I have personally reviewed pertinent reports  and I have personally reviewed pertinent films in PACS  EKG: Sinus tach- This was personally reviewed by myself  Micro:  No results found for: Bubba Velasco, SPUTUMCULTUR    Assessment:   1  LLE DVT  2  Presumed PE  3  Right humeral fracture s/p surgery with external fixator  4  HTN  5  Tobacco abuse  6  Depression        Plan: THe patient is a 36year old female s/p right humeral fracture repair 3 weeks ago with LLE DVT and presumed PE                   Neuro:   Depression-continue antidepressant  [facility] ICU  Sleep hygiene                 CV:   RV strain-2/2 PE, continue heparin drip  HTN-stable, continue losartan  NSTEMI type 2, 2/2 presumed PE-will trend troponins and follow EKG, on heparin drip                   Lung:   Presumed PE-unable to scan given external fixator, but high probability given confirmed LE DVT and RV strain seen on echo, along with her complaints of SOB and chest pain-heparin drip started, will need conversion to oral agent prior to discharge, monitor for hypoxia                   GI:   Cardiac diet                 FEN:   Monitor lytes and replace as needed                 :   Monitor renal indices and I and Os                 ID:   Continue augmentin-was on post-operatively, monitor WBC and fever curve                 Heme:   Heparin drip, monitor coagulation studies and hemoglobin                 Endo:   Monitor glucose on daily BMP Msk/Skin:   Right humeral fracture s/p repair and external fixator-follows with [facility] as an outpatient, continue augmentin, pain relief as needed                 Disposition:   Will place in inpatient status as she will require a greater than 2 midnight LOS  She will be admitted to the stepdown unit  She will be a full code  VTE Pharmacologic Prophylaxis: Heparin  VTE Mechanical Prophylaxis: sequential compression device     Invasive lines and devices: Invasive Devices     Peripheral Intravenous Line            Peripheral IV 11/10/17 Left Hand less than 1 day                 Code Status: No Order  POA:    POLST:       Given critical illness, patient length of stay will require greater than two midnights  Counseling / Coordination of Care  Total Critical Care time spent 25 minutes excluding procedures, teaching and family updates  Portions of the record may have been created with voice recognition software  Occasional wrong word or "sound a like" substitutions may have occurred due to the inherent limitations of voice recognition software  Read the chart carefully and recognize, using context, where substitutions have occurred          Faizan Campbell

## 2017-11-11 NOTE — PROGRESS NOTES
Progress Note - Critical Care   Nonda Dandy Reddinger 36 y o  female MRN: [id number]  Unit/Bed#:  Encounter: [id number]    Attending Physician: Gene Lam MD      ______________________________________________________________________  Assessment and Plan:   Principal Problem:    Acute deep vein thrombosis (DVT) of lower extremity (HCC)  Active Problems:    Presumed pulmonary embolus    HTN (hypertension)    Tobacco abuse    Depression    Right humeral fracture s/p repair with external fixator  Resolved Problems:    * No resolved hospital problems  *        Neuro:   Depression-continue antidepressant  [facility] ICU  Sleep hygiene    CV:   RV strain-2/2 PE, continue heparin drip  HTN-stable, continue losartan  NSTEMi type 2-2/2 presumed PE-continue to trend troponins and follow EKG, continue heparin drip    Pulm:   Presumed PE-unable to scan given external fixator, but high probability given confirmed LE DVT and RV strain seen on echo, along with her complaints of SOB and chest pain-continue heparin drip, monitor for hypoxia    GI:   Cardiac diet    : Monitor renal indices and I and Os    F/E/N:   Monitor lytes and replace as needed    ID:   Continue augmentin-was on post-operatively, monitor WBC and fever curve    Heme:   Continue heparin drip    Endo:   Monitor glucose on daily BMP     Msk/Skin:   Right humeral fracture s/p repair and external fixator-follows with [facility] as an outpatient, continue augmentin, pain relief as needed    Disposition:   Maintain stepdown level of care    Code Status: Level 1 - Full Code  ______________________________________________________________________    Chief Complaint: SOB    24 Hour Events:   Admitted yesterday with SOB and chest pain  Was found to have a LE DVT and RV strain on echo  Unable to scan for PE given the external fixator on her right arm    Heparin drip started for presumed PE     ______________________________________________________________________  Physical Exam   Constitutional: Oriented to person, place, and time and well-developed, well-nourished, and in no acute distress  Head: Normocephalic and atraumatic  Eyes: Conjunctivae are normal  Pupils are equal, round, and reactive to light  Neck: Neck supple  No JVD present  Cardiovascular: Normal rate, regular rhythm and normal heart sounds  Exam reveals no gallop and no friction rub  No murmur heard  Pulmonary/Chest: Effort normal and breath sounds normal    Abdominal: Soft  Bowel sounds are normal  No distension  No tenderness  Genitourinary: Deferred   Musculoskeletal: Right arm with external fixator, +1 LLE edema   Lymphadenopathy:   No cervical adenopathy  Neurological: Alert and oriented to person, place, and time  No cranial nerve deficit  GCS score is 15  Skin: Skin is warm and dry  No rash noted  Psychiatric: Mood and affect normal        ______________________________________________________________________  Vitals:    11/10/17 1830 11/10/17 1943 11/10/17 2030 11/10/17 2319   BP: 110/63 110/57 115/61 118/65   Pulse: (!) 112 (!) 107 105 104   Resp: 16 16 20 (!) 28   Temp:  97 9 °F (36 6 °C)  97 7 °F (36 5 °C)   TempSrc:  Oral  Oral   SpO2: 97% 96% 95% 93%   Weight:  121 kg (267 lb 3 2 oz)     Height:           Temperature:   Temp (24hrs), Av 9 °F (36 6 °C), Min:97 7 °F (36 5 °C), Max:98 °F (36 7 °C)    Current Temperature: 97 7 °F (36 5 °C)  Weights:   IBW: 59 3 kg    Body mass index is 43 13 kg/m²    Weight (last 2 days)     Date/Time   Weight    11/10/17 1943  121 (267 2)    11/10/17 1240  120 (265)            Hemodynamic Monitoring:  N/A     Non-Invasive/Invasive Ventilation Settings:  Respiratory    Lab Data (Last 4 hours)    None         O2/Vent Data (Last 4 hours)    None              No results found for: PHART, GYR2LSQ, PO2ART, BMU8ITX, F9DGVEAI, BEART, SOURCE  SpO2: SpO2: 93 %  Intake and Outputs:  I/O       701 - 11/10 0700 11/10 0701 - 700    I V  (mL/kg)  334 3 (2 8)    Total Intake(mL/kg)  334 3 (2 8)    Net   +334 3                Nutrition:        Diet Orders            Start     Ordered    11/10/17 1942  Diet Cardiac; Cardiac Step 1  Diet effective now     Question Answer Comment   Diet Type Cardiac    Cardiac Cardiac Step 1    RD to adjust diet per protocol? Yes        11/10/17 1941          Labs:     Results from last 7 days  Lab Units 11/10/17  1258   WBC Thousand/uL 19 86*   HEMOGLOBIN g/dL 13 6   HEMATOCRIT % 41 6   PLATELETS Thousands/uL 266   NEUTROS PCT % 79*   MONOS PCT % 5       Results from last 7 days  Lab Units 11/10/17  1258   SODIUM mmol/L 138   POTASSIUM mmol/L 4 1   CHLORIDE mmol/L 103   CO2 mmol/L 23   BUN mg/dL 12   CREATININE mg/dL 0 82   CALCIUM mg/dL 8 7   TOTAL PROTEIN g/dL 7 5   BILIRUBIN TOTAL mg/dL 0 20   ALK PHOS U/L 94   ALT U/L 16   AST U/L 10   GLUCOSE RANDOM mg/dL 179*         No results found for: PHOS     Results from last 7 days  Lab Units 11/10/17  2248 11/10/17  1259   INR   --  0 94   PTT seconds 105* 22*       0  Lab Value Date/Time   TROPONINI 0 22 (H) 11/10/2017 2248   TROPONINI 0 17 (H) 11/10/2017 2043   TROPONINI 0 39 (H) 11/10/2017 1258         ABG:No results found for: PHART, VVZ5TXQ, PO2ART, SGH6ZVF, R8RSIYEA, BEART, SOURCE  Imaging:   XR chest 1 view portable   Final Result      No acute pulmonary disease  Workstation performed: [id number]         VAS lower limb venous duplex study, complete bilateral    (Results Pending)      I have personally reviewed pertinent reports  and I have personally reviewed pertinent films in PACS  EKG: NSR  Micro:  No results found for: Seretha Foster, SPUTUMCULTUR  Allergies:    Allergies   Allergen Reactions    Buspar [Buspirone] Rash    Other Rash     Surgical tape     Medications:   Scheduled Meds:  amoxicillin-clavulanate 1 tablet Oral Q12H [address]   cholecalciferol 1,000 Units Oral Weekly   citalopram 40 mg Oral Daily   clonazePAM 0 5 mg Oral Daily   losartan 50 mg Oral BID     Continuous Infusions:  heparin (porcine) 3-30 Units/kg/hr (Order-Specific) Last Rate: 16 Units/kg/hr (11/10/17 2322)   sodium chloride 75 mL/hr Last Rate: 75 mL/hr (11/10/17 1958)     PRN Meds:    heparin (porcine) 4,800 Units PRN   heparin (porcine) 9,600 Units PRN     VTE Pharmacologic Prophylaxis: Heparin Drip  VTE Mechanical Prophylaxis: sequential compression device  Invasive lines and devices: Invasive Devices     Peripheral Intravenous Line            Peripheral IV 11/10/17 Left Hand less than 1 day                     Portions of the record may have been created with voice recognition software  Occasional wrong word or "sound a like" substitutions may have occurred due to the inherent limitations of voice recognition software  Read the chart carefully and recognize, using context, where substitutions have occurred      Beau Warner

## 2017-11-11 NOTE — MALNUTRITION/BMI
This medical record reflects one or more clinical indicators suggestive of morbid obesity  Please indicate the one diagnosis below which you feel best reflects the clinical picture  BMI Findings:  40-44 9  Morbid obesity related to energy intake exceeding energy expenditure over time as evidenced by  Body mass index is 43 59 kg/m²  See Nutrition note dated 11/11/17 for additional details  Completed nutrition assessment is viewable in the nutrition documentation

## 2017-11-12 PROBLEM — E66.01 MORBID OBESITY (HCC): Status: ACTIVE | Noted: 2017-11-12

## 2017-11-12 LAB
APTT PPP: 101 SECONDS (ref 23–35)
APTT PPP: 55 SECONDS (ref 23–35)
APTT PPP: 61 SECONDS (ref 23–35)
APTT PPP: 74 SECONDS (ref 23–35)
INR PPP: 0.98 (ref 0.86–1.16)
PROTHROMBIN TIME: 13.2 SECONDS (ref 12.1–14.4)

## 2017-11-12 PROCEDURE — 93005 ELECTROCARDIOGRAM TRACING: CPT | Performed by: NURSE PRACTITIONER

## 2017-11-12 PROCEDURE — 85730 THROMBOPLASTIN TIME PARTIAL: CPT | Performed by: INTERNAL MEDICINE

## 2017-11-12 PROCEDURE — 85610 PROTHROMBIN TIME: CPT | Performed by: INTERNAL MEDICINE

## 2017-11-12 RX ORDER — GINSENG 100 MG
1 CAPSULE ORAL 2 TIMES WEEKLY
Status: DISCONTINUED | OUTPATIENT
Start: 2017-11-12 | End: 2017-11-15 | Stop reason: HOSPADM

## 2017-11-12 RX ORDER — WARFARIN SODIUM 5 MG/1
10 TABLET ORAL
Status: COMPLETED | OUTPATIENT
Start: 2017-11-12 | End: 2017-11-12

## 2017-11-12 RX ADMIN — LOSARTAN POTASSIUM 50 MG: 50 TABLET, FILM COATED ORAL at 08:03

## 2017-11-12 RX ADMIN — SODIUM CHLORIDE 75 ML/HR: 0.9 INJECTION, SOLUTION INTRAVENOUS at 08:10

## 2017-11-12 RX ADMIN — CLONAZEPAM 0.5 MG: 0.5 TABLET ORAL at 08:03

## 2017-11-12 RX ADMIN — BACITRACIN ZINC 1 SMALL APPLICATION: 500 OINTMENT TOPICAL at 17:23

## 2017-11-12 RX ADMIN — CITALOPRAM HYDROBROMIDE 40 MG: 20 TABLET ORAL at 08:03

## 2017-11-12 RX ADMIN — AMOXICILLIN AND CLAVULANATE POTASSIUM 1 TABLET: 500; 125 TABLET, FILM COATED ORAL at 21:00

## 2017-11-12 RX ADMIN — HEPARIN SODIUM AND DEXTROSE 16 UNITS/KG/HR: 10000; 5 INJECTION INTRAVENOUS at 08:09

## 2017-11-12 RX ADMIN — HEPARIN SODIUM AND DEXTROSE 18 UNITS/KG/HR: 10000; 5 INJECTION INTRAVENOUS at 16:16

## 2017-11-12 RX ADMIN — SODIUM CHLORIDE 75 ML/HR: 0.9 INJECTION, SOLUTION INTRAVENOUS at 21:23

## 2017-11-12 RX ADMIN — HEPARIN SODIUM AND DEXTROSE 18 UNITS/KG/HR: 10000; 5 INJECTION INTRAVENOUS at 21:19

## 2017-11-12 RX ADMIN — LOSARTAN POTASSIUM 50 MG: 50 TABLET, FILM COATED ORAL at 17:23

## 2017-11-12 RX ADMIN — WARFARIN SODIUM 10 MG: 5 TABLET ORAL at 17:22

## 2017-11-12 RX ADMIN — AMOXICILLIN AND CLAVULANATE POTASSIUM 1 TABLET: 500; 125 TABLET, FILM COATED ORAL at 08:03

## 2017-11-12 NOTE — SOCIAL WORK
Cm met with patient at bedside, patient alert and oriented during interview  Patient reports residing in a private home in [facility] with her SO and children  Patient reports being independent with ADL's, no dme use, vna or hx of str  Patient reports filling her prescriptions at [facility] with no co-payment barriers  Patient reports following up with her PCP as recommended has adequate transportation and reports having strong family and community supports  Patient declining referral to vna or resources for advanced directives  Patient reports no needs at this time cm team will continue to follow and assess

## 2017-11-12 NOTE — PROGRESS NOTES
[facility] Progress Note  Patient: Mima Lane 36 y o  female   MRN: [id number]  PCP: Peter Robbins DO  Unit/Bed#: -01 Encounter: [id number]  Date Of Visit: 11/12/17    Assessment/Plan:          · Acute DVT left lower leg  Major risk factor appears to be patient not being very ambulatory and morbidly obese in addition to having recent surgery  I am not sure if she was on any anti coagulant for DVT prophylaxis for her right arm surgery  On IV heparin  Would start her on Coumadin  Patient would need at least 48 hours overlapped with them IV heparin when INR is therapeutic or she has been on a IV heparin with therapeutic levels for about a week or so  · Morbid obesity  Major comorbid issue  Needs to work on her diet and exercise  · Essential hypertension  Continue with current treatment  · Presumed pulmonary embolus  For some reason, she could not get a CT scan because of her right arm hardware  Weight may be an issue  · Depression  Continue home medications  · Right humeral fracture status post repair with external fixator  Patient to follow up with her surgeon after discharge  · Tobacco abuse  Encouraged to quit      VTE Pharmacologic Prophylaxis:   Pharmacologic: Heparin Drip  Mechanical VTE Prophylaxis in Place: Only right leg    Patient Centered Rounds: I have performed bedside rounds with nursing staff today  Discussions with Specialists or Other Care Team Provider:  Yes    Education and Discussions with Family / Patient:  Yes    Time Spent for Care: 35+ minutes  More than 50% of total time spent on counseling and coordination of care as described above  Current Length of Stay: 2 day(s)    Current Patient Status: Inpatient   Certification Statement: The patient will continue to require additional inpatient hospital stay due to IV heparin/Coumadin    Discharge Plan:  Home when stable    Code Status: Level 1 - Full Code      Subjective:   Feels okay    Becomes short of breath with activity  No chest pain at rest   Denies any fever or chills  Denies any nausea or vomiting  Objective:     Vitals:   Temp (24hrs), Av 3 °F (36 8 °C), Min:97 6 °F (36 4 °C), Max:99 1 °F (37 3 °C)    HR:  [65-88] 65  Resp:  [18-19] 18  BP: (111-141)/(61-92) 116/61  SpO2:  [95 %-98 %] 98 %  Body mass index is 43 59 kg/m²  Input and Output Summary (last 24 hours): Intake/Output Summary (Last 24 hours) at 17 0946  Last data filed at 17 1831   Gross per 24 hour   Intake              220 ml   Output              200 ml   Net               20 ml           Physical Exam:     Vital signs reviewed as above  Patient is in bed  Not in any respiratory distress while at rest  Abdomen is obese  It is nontender  Bowel sounds are audible  Has external fixator right arm  S1 and S2 are audible  In sinus rhythm  Awake and alert  Oriented x3  Has no significant swelling of her legs  Oropharynx are moist and crowded  Conjunctivae slightly pale    Additional Data:     Labs:      Results from last 7 days  Lab Units 17  0507   WBC Thousand/uL 14 86*   HEMOGLOBIN g/dL 11 3*   HEMATOCRIT % 35 4   PLATELETS Thousands/uL 243   NEUTROS PCT % 54   LYMPHS PCT % 37   MONOS PCT % 5   EOS PCT % 3       Results from last 7 days  Lab Units 17  0507   SODIUM mmol/L 140   POTASSIUM mmol/L 3 8   CHLORIDE mmol/L 105   CO2 mmol/L 26   BUN mg/dL 12   CREATININE mg/dL 0 85   CALCIUM mg/dL 8 0*   TOTAL PROTEIN g/dL 6 3*   BILIRUBIN TOTAL mg/dL 0 40   ALK PHOS U/L 76   ALT U/L 13   AST U/L 12   GLUCOSE RANDOM mg/dL 140       Results from last 7 days  Lab Units 11/10/17  1259   INR  0 94       * I Have Reviewed All Lab Data Listed Above  * Additional Pertinent Lab Tests Reviewed:  All Labs Within Last 24 Hours Reviewed    I  Recent Cultures (last 7 days):           Last 24 Hours Medication List:     amoxicillin-clavulanate 1 tablet Oral Q12H [address]   cholecalciferol 1,000 Units Oral Weekly citalopram 40 mg Oral Daily   clonazePAM 0 5 mg Oral Daily   losartan 50 mg Oral BID   warfarin 10 mg Oral Once (warfarin)        Today, Patient Was Seen By: Corbin Roberto MD    ** Please Note: Dragon 360 Dictation voice to text software may have been used in the creation of this document   **

## 2017-11-12 NOTE — PLAN OF CARE

## 2017-11-12 NOTE — PLAN OF CARE
DISCHARGE PLANNING     Discharge to home or other facility with appropriate resources Progressing        INFECTION - ADULT     Absence or prevention of progression during hospitalization Progressing        Knowledge Deficit     Patient/family/caregiver demonstrates understanding of disease process, treatment plan, medications, and discharge instructions Progressing        PAIN - ADULT     Verbalizes/displays adequate comfort level or baseline comfort level Progressing        Potential for Falls     Patient will remain free of falls Progressing        Prexisting or High Potential for Compromised Skin Integrity     Skin integrity is maintained or improved Progressing        SAFETY ADULT     Maintain or return to baseline ADL function Progressing     Maintain or return mobility status to optimal level Progressing

## 2017-11-13 LAB
APTT PPP: 104 SECONDS (ref 23–35)
APTT PPP: 131 SECONDS (ref 23–35)
APTT PPP: 45 SECONDS (ref 23–35)
ATRIAL RATE: 61 BPM
ATRIAL RATE: 64 BPM
ATRIAL RATE: 80 BPM
INR PPP: 1.03 (ref 0.86–1.16)
P AXIS: 52 DEGREES
P AXIS: 53 DEGREES
P AXIS: 64 DEGREES
PR INTERVAL: 150 MS
PR INTERVAL: 150 MS
PR INTERVAL: 154 MS
PROTHROMBIN TIME: 13.7 SECONDS (ref 12.1–14.4)
QRS AXIS: 22 DEGREES
QRS AXIS: 3 DEGREES
QRS AXIS: 6 DEGREES
QRSD INTERVAL: 82 MS
QRSD INTERVAL: 88 MS
QRSD INTERVAL: 88 MS
QT INTERVAL: 432 MS
QT INTERVAL: 564 MS
QT INTERVAL: 574 MS
QTC INTERVAL: 498 MS
QTC INTERVAL: 577 MS
QTC INTERVAL: 581 MS
T WAVE AXIS: 1 DEGREES
T WAVE AXIS: 3 DEGREES
T WAVE AXIS: 59 DEGREES
VENTRICULAR RATE: 61 BPM
VENTRICULAR RATE: 64 BPM
VENTRICULAR RATE: 80 BPM

## 2017-11-13 PROCEDURE — 93005 ELECTROCARDIOGRAM TRACING: CPT | Performed by: NURSE PRACTITIONER

## 2017-11-13 PROCEDURE — 85610 PROTHROMBIN TIME: CPT | Performed by: INTERNAL MEDICINE

## 2017-11-13 PROCEDURE — 93005 ELECTROCARDIOGRAM TRACING: CPT

## 2017-11-13 PROCEDURE — 85730 THROMBOPLASTIN TIME PARTIAL: CPT | Performed by: INTERNAL MEDICINE

## 2017-11-13 RX ORDER — MELATONIN
1000 WEEKLY
Status: DISCONTINUED | OUTPATIENT
Start: 2017-11-13 | End: 2017-11-15 | Stop reason: HOSPADM

## 2017-11-13 RX ORDER — WARFARIN SODIUM 5 MG/1
10 TABLET ORAL
Status: COMPLETED | OUTPATIENT
Start: 2017-11-13 | End: 2017-11-13

## 2017-11-13 RX ADMIN — AMOXICILLIN AND CLAVULANATE POTASSIUM 1 TABLET: 500; 125 TABLET, FILM COATED ORAL at 22:08

## 2017-11-13 RX ADMIN — CLONAZEPAM 0.5 MG: 0.5 TABLET ORAL at 08:30

## 2017-11-13 RX ADMIN — AMOXICILLIN AND CLAVULANATE POTASSIUM 1 TABLET: 500; 125 TABLET, FILM COATED ORAL at 08:31

## 2017-11-13 RX ADMIN — LOSARTAN POTASSIUM 50 MG: 50 TABLET, FILM COATED ORAL at 17:16

## 2017-11-13 RX ADMIN — HEPARIN SODIUM AND DEXTROSE 16 UNITS/KG/HR: 10000; 5 INJECTION INTRAVENOUS at 09:29

## 2017-11-13 RX ADMIN — HEPARIN SODIUM 4800 UNITS: 1000 INJECTION, SOLUTION INTRAVENOUS; SUBCUTANEOUS at 15:59

## 2017-11-13 RX ADMIN — LOSARTAN POTASSIUM 50 MG: 50 TABLET, FILM COATED ORAL at 08:31

## 2017-11-13 RX ADMIN — SODIUM CHLORIDE 75 ML/HR: 0.9 INJECTION, SOLUTION INTRAVENOUS at 09:29

## 2017-11-13 RX ADMIN — HEPARIN SODIUM AND DEXTROSE 18 UNITS/KG/HR: 10000; 5 INJECTION INTRAVENOUS at 14:30

## 2017-11-13 RX ADMIN — VITAMIN D, TAB 1000IU (100/BT) 1000 UNITS: 25 TAB at 08:36

## 2017-11-13 RX ADMIN — CITALOPRAM HYDROBROMIDE 40 MG: 20 TABLET ORAL at 08:31

## 2017-11-13 RX ADMIN — WARFARIN SODIUM 10 MG: 5 TABLET ORAL at 17:17

## 2017-11-13 NOTE — PROGRESS NOTES
[address] Internal Medicine Progress Note  Patient: Errol Lane 36 y o  female   MRN: [id number]  PCP: Tyrel Mckee DO  Unit/Bed#: -01 Encounter: [id number]  Date Of Visit: 11/13/17    Assessment/Plan:    Principal Problem:    Acute deep vein thrombosis (DVT) of lower extremity ([address] )  Active Problems:    Presumed pulmonary embolus    HTN (hypertension)    Tobacco abuse    Depression    Right humeral fracture s/p repair with external fixator    Morbid obesity ([address] )    Present on Admission:   Acute deep vein thrombosis (DVT) of lower extremity ([address] )   Presumed pulmonary embolus   HTN (hypertension)   Tobacco abuse   Depression   Right humeral fracture s/p repair with external fixator   Morbid obesity ([address] )      · Acute DVT left lower leg  Presumed pulmonary embolism  For some reason, she could not get CT scan because of her right arm external fixator  On IV heparin  Started on Coumadin yesterday  Goal INR 2-3  Needs to overlapped Coumadin / IV heparin when INR is therapeutic depending upon how long it would take for INR to become therapeutic  Given her morbid obesity, I would recommend that patient to go home on a Coumadin  · History of recent right arm surgery and patient has external fixator  Continue with local treatment  Patient to follow up with her orthopedic surgeon at the time of discharge  · Depression  Continue with home  · Essential hypertension  Continue with home med  · Tobacco abuse  Encouraged to quit  · Morbid obesity  She needs to work on her diet and exercise  VTE Pharmacologic Prophylaxis:   Pharmacologic: Heparin Drip  Mechanical VTE Prophylaxis in Place: Only a right leg    Patient Centered Rounds: I have performed bedside rounds with nursing staff today      Discussions with Specialists or Other Care Team Provider:  Yes    Education and Discussions with Family / Patient:  Yes        Current Length of Stay: 3 day(s)    Current Patient Status: Inpatient Certification Statement: The patient will continue to require additional inpatient hospital stay due to IV heparin/Coumadin    Discharge Plan:  Home when stable  Discussed with     Code Status: Level 1 - Full Code      Subjective:   Feels okay  Denies any chest pain  She thinks that her breathing is also little better although she has not been no moving around a whole lot  Has some pain in her right arm as expected  Objective:     Vitals:   Temp (24hrs), Av 1 °F (36 7 °C), Min:97 7 °F (36 5 °C), Max:98 7 °F (37 1 °C)    HR:  [62-74] 74  Resp:  [18] 18  BP: (111-129)/(55-79) 129/59  SpO2:  [92 %-96 %] 95 %  Body mass index is 44 12 kg/m²  Input and Output Summary (last 24 hours): Intake/Output Summary (Last 24 hours) at 17 0819  Last data filed at 17 2255   Gross per 24 hour   Intake          1259 96 ml   Output                0 ml   Net          1259 96 ml           Physical Exam:     Vital signs are reviewed as above  Constitutional:  Patient up in the chair  Eyes: EOM grossly intact  HENT: Oropharynx are moist and crowded  Neck: Neck is obese and supple  Cardiac: I did not hear any rubs or gallop  Patient appears to be in sinus rhythm  Respiratory: Patient not in significant respiratory distress  Air entry in general is fair although limited because of body habitus  GI: Abdomen is obese and soft  It is grossly nontender  Bowel sounds are adequate  I was not able to appreciate any hepatosplenomegaly  Neurologic:  Patient is awake and alert  Neurological examination is grossly intact  No obvious focal neurological deficit noticed  Skin: Skin is warm and dry     Psychiatric: Mood and affect are pleasant  Musculoskeletal   Has external fixator on the right arm   Extremities:  As above      Additional Data:     Labs:      Results from last 7 days  Lab Units 17  0507   WBC Thousand/uL 14 86*   HEMOGLOBIN g/dL 11 3*   HEMATOCRIT % 35 4   PLATELETS Thousands/uL 243   NEUTROS PCT % 54   LYMPHS PCT % 37   MONOS PCT % 5   EOS PCT % 3       Results from last 7 days  Lab Units 11/11/17  0507   SODIUM mmol/L 140   POTASSIUM mmol/L 3 8   CHLORIDE mmol/L 105   CO2 mmol/L 26   BUN mg/dL 12   CREATININE mg/dL 0 85   CALCIUM mg/dL 8 0*   TOTAL PROTEIN g/dL 6 3*   BILIRUBIN TOTAL mg/dL 0 40   ALK PHOS U/L 76   ALT U/L 13   AST U/L 12   GLUCOSE RANDOM mg/dL 140       Results from last 7 days  Lab Units 11/13/17  0510   INR  1 03       * I Have Reviewed All Lab Data Listed Above  * Additional Pertinent Lab Tests Reviewed: All Labs Within Last 24 Hours Reviewed      Recent Cultures (last 7 days):           Last 24 Hours Medication List:     amoxicillin-clavulanate 1 tablet Oral Q12H [address]   bacitracin 1 small application Topical Once per day on Mon Thu   cholecalciferol 1,000 Units Oral Weekly   citalopram 40 mg Oral Daily   clonazePAM 0 5 mg Oral Daily   losartan 50 mg Oral BID   warfarin 10 mg Oral Once (warfarin)        Today, Patient Was Seen By: Jaqui Herndon MD    ** Please Note: Dragon 360 Dictation voice to text software may have been used in the creation of this document   **

## 2017-11-14 ENCOUNTER — APPOINTMENT (INPATIENT)
Dept: RADIOLOGY | Facility: HOSPITAL | Age: 42
DRG: 197 | End: 2017-11-14
Payer: COMMERCIAL

## 2017-11-14 ENCOUNTER — APPOINTMENT (INPATIENT)
Dept: CT IMAGING | Facility: HOSPITAL | Age: 42
DRG: 197 | End: 2017-11-14
Payer: COMMERCIAL

## 2017-11-14 LAB
APTT PPP: 38 SECONDS (ref 23–35)
APTT PPP: 49 SECONDS (ref 23–35)
APTT PPP: 64 SECONDS (ref 23–35)
APTT PPP: 65 SECONDS (ref 23–35)
ATRIAL RATE: 54 BPM
ATRIAL RATE: 57 BPM
ATRIAL RATE: 59 BPM
ATRIAL RATE: 62 BPM
DEPRECATED AT III PPP: 67 % OF NORMAL (ref 92–136)
INR PPP: 1.29 (ref 0.86–1.16)
P AXIS: 51 DEGREES
P AXIS: 55 DEGREES
P AXIS: 57 DEGREES
P AXIS: 62 DEGREES
PR INTERVAL: 156 MS
PR INTERVAL: 160 MS
PR INTERVAL: 166 MS
PR INTERVAL: 176 MS
PROTHROMBIN TIME: 16.4 SECONDS (ref 12.1–14.4)
QRS AXIS: 13 DEGREES
QRS AXIS: 16 DEGREES
QRSD INTERVAL: 90 MS
QRSD INTERVAL: 90 MS
QRSD INTERVAL: 92 MS
QRSD INTERVAL: 94 MS
QT INTERVAL: 482 MS
QT INTERVAL: 484 MS
QT INTERVAL: 524 MS
QT INTERVAL: 532 MS
QTC INTERVAL: 479 MS
QTC INTERVAL: 489 MS
QTC INTERVAL: 504 MS
QTC INTERVAL: 510 MS
T WAVE AXIS: 15 DEGREES
T WAVE AXIS: 17 DEGREES
T WAVE AXIS: 19 DEGREES
T WAVE AXIS: 21 DEGREES
TROPONIN I SERPL-MCNC: <0.02 NG/ML
VENTRICULAR RATE: 54 BPM
VENTRICULAR RATE: 57 BPM
VENTRICULAR RATE: 59 BPM
VENTRICULAR RATE: 62 BPM

## 2017-11-14 PROCEDURE — 85303 CLOT INHIBIT PROT C ACTIVITY: CPT | Performed by: PHYSICIAN ASSISTANT

## 2017-11-14 PROCEDURE — 85306 CLOT INHIBIT PROT S FREE: CPT | Performed by: PHYSICIAN ASSISTANT

## 2017-11-14 PROCEDURE — 81241 F5 GENE: CPT | Performed by: PHYSICIAN ASSISTANT

## 2017-11-14 PROCEDURE — 85730 THROMBOPLASTIN TIME PARTIAL: CPT | Performed by: INTERNAL MEDICINE

## 2017-11-14 PROCEDURE — 93005 ELECTROCARDIOGRAM TRACING: CPT

## 2017-11-14 PROCEDURE — 81240 F2 GENE: CPT | Performed by: PHYSICIAN ASSISTANT

## 2017-11-14 PROCEDURE — 85300 ANTITHROMBIN III ACTIVITY: CPT | Performed by: PHYSICIAN ASSISTANT

## 2017-11-14 PROCEDURE — 85670 THROMBIN TIME PLASMA: CPT | Performed by: PHYSICIAN ASSISTANT

## 2017-11-14 PROCEDURE — 71275 CT ANGIOGRAPHY CHEST: CPT

## 2017-11-14 PROCEDURE — 85305 CLOT INHIBIT PROT S TOTAL: CPT | Performed by: PHYSICIAN ASSISTANT

## 2017-11-14 PROCEDURE — 93005 ELECTROCARDIOGRAM TRACING: CPT | Performed by: NURSE PRACTITIONER

## 2017-11-14 PROCEDURE — 86146 BETA-2 GLYCOPROTEIN ANTIBODY: CPT | Performed by: PHYSICIAN ASSISTANT

## 2017-11-14 PROCEDURE — 84484 ASSAY OF TROPONIN QUANT: CPT | Performed by: INTERNAL MEDICINE

## 2017-11-14 PROCEDURE — 85732 THROMBOPLASTIN TIME PARTIAL: CPT | Performed by: PHYSICIAN ASSISTANT

## 2017-11-14 PROCEDURE — 85705 THROMBOPLASTIN INHIBITION: CPT | Performed by: PHYSICIAN ASSISTANT

## 2017-11-14 PROCEDURE — 85613 RUSSELL VIPER VENOM DILUTED: CPT | Performed by: PHYSICIAN ASSISTANT

## 2017-11-14 PROCEDURE — 85610 PROTHROMBIN TIME: CPT | Performed by: INTERNAL MEDICINE

## 2017-11-14 PROCEDURE — 73060 X-RAY EXAM OF HUMERUS: CPT

## 2017-11-14 PROCEDURE — 86147 CARDIOLIPIN ANTIBODY EA IG: CPT | Performed by: PHYSICIAN ASSISTANT

## 2017-11-14 RX ORDER — SACCHAROMYCES BOULARDII 250 MG
250 CAPSULE ORAL 2 TIMES DAILY
Status: DISCONTINUED | OUTPATIENT
Start: 2017-11-14 | End: 2017-11-15 | Stop reason: HOSPADM

## 2017-11-14 RX ORDER — WARFARIN SODIUM 5 MG/1
10 TABLET ORAL
Status: DISCONTINUED | OUTPATIENT
Start: 2017-11-14 | End: 2017-11-14

## 2017-11-14 RX ADMIN — CLONAZEPAM 0.5 MG: 0.5 TABLET ORAL at 09:15

## 2017-11-14 RX ADMIN — CITALOPRAM HYDROBROMIDE 40 MG: 20 TABLET ORAL at 09:15

## 2017-11-14 RX ADMIN — AMOXICILLIN AND CLAVULANATE POTASSIUM 1 TABLET: 500; 125 TABLET, FILM COATED ORAL at 20:42

## 2017-11-14 RX ADMIN — LOSARTAN POTASSIUM 50 MG: 50 TABLET, FILM COATED ORAL at 09:15

## 2017-11-14 RX ADMIN — IOHEXOL 85 ML: 350 INJECTION, SOLUTION INTRAVENOUS at 12:03

## 2017-11-14 RX ADMIN — Medication 250 MG: at 17:24

## 2017-11-14 RX ADMIN — SODIUM CHLORIDE 75 ML/HR: 0.9 INJECTION, SOLUTION INTRAVENOUS at 00:24

## 2017-11-14 RX ADMIN — HEPARIN SODIUM 4800 UNITS: 1000 INJECTION, SOLUTION INTRAVENOUS; SUBCUTANEOUS at 09:14

## 2017-11-14 RX ADMIN — HEPARIN SODIUM AND DEXTROSE 15 UNITS/KG/HR: 10000; 5 INJECTION INTRAVENOUS at 02:23

## 2017-11-14 RX ADMIN — LOSARTAN POTASSIUM 50 MG: 50 TABLET, FILM COATED ORAL at 17:24

## 2017-11-14 RX ADMIN — HEPARIN SODIUM 9600 UNITS: 1000 INJECTION, SOLUTION INTRAVENOUS; SUBCUTANEOUS at 20:28

## 2017-11-14 RX ADMIN — HEPARIN SODIUM AND DEXTROSE 17 UNITS/KG/HR: 10000; 5 INJECTION INTRAVENOUS at 09:13

## 2017-11-14 RX ADMIN — AMOXICILLIN AND CLAVULANATE POTASSIUM 1 TABLET: 500; 125 TABLET, FILM COATED ORAL at 09:15

## 2017-11-14 RX ADMIN — HEPARIN SODIUM AND DEXTROSE 17 UNITS/KG/HR: 10000; 5 INJECTION INTRAVENOUS at 14:27

## 2017-11-14 NOTE — CONSULTS
Orthopedics   Kelly Doretha Lane 36 y o  female MRN: [id number]  Unit/Bed#: MO XRAY      Chief Complaint:   right elbow pain    HPI:   36 y o  female lying in bed comfortable in no acute distress  Patient has long history of right humerus fracture  Patient had recent surgery with Dr Elina Mejia at [facility]  with open reduction internal fixation of distal humerus fracture with placement of external fixator on October 27, 2017  Patient is currently following up with him for her aftercare  Patient came to the hospital with some difficulty breathing  Patient does have a lower extremity DVT and is suspected to have PE and is being treated  Patient has a follow-up in a couple weeks with Dr Elina Mejia for her distal humerus fracture  No complaints of numbness or tingling today in the bed  Review Of Systems:   · Skin: Normal  · Neuro: See HPI  · Musculoskeletal: See HPI  · 14 point review of systems negative except as stated above     Past Medical History:   Past Medical History:   Diagnosis Date    Anxiety     Depression     Hypertension        Past Surgical History:   Past Surgical History:   Procedure Laterality Date    HUMERUS FRACTURE SURGERY         Family History:  Family history reviewed and non-contributory  History reviewed  No pertinent family history  Social History:  Social History     Social History    Marital status: Single     Spouse name: N/A    Number of children: N/A    Years of education: N/A     Social History Main Topics    Smoking status: Current Every Day Smoker     Packs/day: 1 00     Types: Cigarettes    Smokeless tobacco: Never Used    Alcohol use No    Drug use: No    Sexual activity: Not Currently     Partners: Male     Other Topics Concern    None     Social History Narrative    None       Allergies:    Allergies   Allergen Reactions    Buspar [Buspirone] Rash    Other Rash     Surgical tape           Labs:    0  Lab Value Date/Time   HCT 35 4 11/11/2017 0507 HCT 41 6 11/10/2017 1258   HCT 44 3 06/12/2014 0640   HGB 11 3 (L) 11/11/2017 0507   HGB 13 6 11/10/2017 1258   HGB 14 9 06/12/2014 0640   HGB 15 0 06/11/2014 1145   INR 1 29 (H) 11/14/2017 0433   WBC 14 86 (H) 11/11/2017 0507   WBC 19 86 (H) 11/10/2017 1258   WBC 15 45 (H) 06/12/2014 0640       Meds:    Current Facility-Administered Medications:     amoxicillin-clavulanate (AUGMENTIN) 500-125 mg per tablet 1 tablet, 1 tablet, Oral, Q12H NAYANA, PEGGY Verdin, 1 tablet at 11/14/17 0915    bacitracin topical ointment 1 small application, 1 small application, Topical, Once per day on Mon Thu, Barbra Hemphill MD, 1 small application at 96/70/98 1723    cholecalciferol (VITAMIN D3) tablet 1,000 Units, 1,000 Units, Oral, Weekly, Barbra Hemphill MD, 1,000 Units at 11/13/17 0836    citalopram (CeleXA) tablet 40 mg, 40 mg, Oral, Daily, PEGGY Verdin, 40 mg at 11/14/17 0915    clonazePAM (KlonoPIN) tablet 0 5 mg, 0 5 mg, Oral, Daily, PEGGY Verdin, 0 5 mg at 11/14/17 0915    heparin (porcine) 25,000 units in 250 mL infusion (premix), 3-30 Units/kg/hr (Order-Specific), Intravenous, Titrated, Sarah Reyes MD, Last Rate: 20 4 mL/hr at 11/14/17 0913, 17 Units/kg/hr at 11/14/17 0913    heparin (porcine) injection 4,800 Units, 4,800 Units, Intravenous, PRN, Sarah Reyes MD, 4,800 Units at 11/14/17 0914    heparin (porcine) injection 9,600 Units, 9,600 Units, Intravenous, PRN, Sarah Reyes MD    losartan (COZAAR) tablet 50 mg, 50 mg, Oral, BID, PEGGY Verdin, 50 mg at 11/14/17 0915    pneumococcal 23-valent polysaccharide vaccine (PNEUMOVAX-23) injection 0 5 mL, 0 5 mL, Subcutaneous, Prior to discharge, PEGGY Wheeler    warfarin (COUMADIN) tablet 10 mg, 10 mg, Oral, Daily (warfarin), Elpidio Jin MD    Blood Culture:   No results found for: BLOODCX    Wound Culture:   No results found for: WOUNDCULT    Ins and Outs:  I/O last 24 hours:   In: 0 [P O :880]  Out: - Physical Exam:   /65   Pulse 65   Temp 97 6 °F (36 4 °C) (Oral)   Resp 18   Ht 5' 6" (1 676 m)   Wt 127 kg (279 lb 1 6 oz)   LMP 11/08/2017 (Exact Date)   SpO2 97%   BMI 45 05 kg/m²   Gen: Alert and oriented to person, place, time  HEENT: EOMI, eyes clear, moist mucus membranes, hearing intact  Respiratory: Bilateral chest rise  No audible wheezing found  Cardiovascular: Regular Rate and Rhythm  Abdomen: soft nontender/nondistended  Musculoskeletal: right upper extremity  · External fixator noted right upper extremity  Bandages and Ace wrap are both clean dry and intact  · Wrist has full range of motion with no pain  Patient able to flex and extend all fingers  · Sensation intact to axillary, median, radial, ulnar, median, nerve distributions  · 2+ radial pulse    Radiology:   I personally reviewed the films  X-rays right elbow shows distal humerus fracture with surgical hardware in place in acceptable position  External fixator also in place    _*_*_*_*_*_*_*_*_*_*_*_*_*_*_*_*_*_*_*_*_*_*_*_*_*_*_*_*_*_*_*_*_*_*_*_*_*_*_*_*_*    Assessment:  36 y  o female S/P fall with right distal humerus fracture which was repaired on October 27, 2017 by Dr Donna Warren at [facility]  Patient with ORIF and external fixator    Plan:   · Non weight bearing right upper extremity   · Analgesics for pain  · Pain medication per medical team  · DVT prophylaxis per medical team  · Dispo: [facility] for discharge from ortho perspective       Patient w follow up with Dr Malena Olea for all aftercare regarding her right humerus fracture      Luisa Nunez PA-C

## 2017-11-14 NOTE — SOCIAL WORK
Per MD pt to go home on Xarelto  Cm contacted her [facility] pharmacy and medication covered with 3$ copay   MD notified

## 2017-11-14 NOTE — CONSULTS
Consultation - Pulmonary Medicine   Santos Lesch Reddinger 36 y o  female MRN: [id number]  Unit/Bed#: -01 Encounter: [id number]      Assessment:  1  Acute bilateral PE with associated right heart strain  2  Acute DVT  3  Likely sleep apnea    Plan:   1  Acute DVT, presumed PE in pt with LOONEY and right heart strain  - Patient with recent surgery 3 weeks ago for humerus fracture, now has acute LLE DVT  CTA was done this morning which shows extensive bilateral PE right greater than left RV/LV ratio is 1 which indicates right heart strain  - She does have some evidence of right heart strain on echo as well with dilated right ventricle and pulm hypertension  - She is currently on Coumadin and heparin  She can be switched to either Eliquis or Xarelto depending on insurance coverage  - Length of treatment will be at least 3 months - this is likely provoked thrombosis from her recent surgery, will send hypercoagulable workup  - She has not been hypoxic, her BP has been stable, no tachycardia  Could do ambulatory sats prior to discharge given LOONEY  - Echo shows dilated right ventricle with PAP at least 30  She does also likely have sleep apnea which could contribute to the right heart findings  Would do a repeat echo in 3 months to reassess  2  Likely sleep apnea  - Witnessed apneic episodes, snoring, excessive daytime sleepiness and body habitus all highly suspicious for sleep apnea, she was scheduled for a sleep study next week  - Could be a contributing factor in her pulm HTN  - Should follow up with us as outpatient      History of Present Illness   Physician Requesting Consult: Lauren Pires MD  Reason for Consult / Principal Problem: PE  Hx and PE limited by: None  HPI: Lakshmi Maloney is a 36y o  year old female current smoker of 1/2 PPD who presents with complaint of LOONEY for several days prior to admission  She had a recent surgery for fractured right humerus   She was found to have acute LLE DVT and was started on heparin  She has no history of clots in the past      She is a current smoker 1/2 PPD would like to quit  She has no history of asthma or COPD, no breathing problems at baseline  She had pneumonia once about 10 years ago after the birth of her daughter  Inpatient consult to Pulmonology  Consult performed by: Tyrel Casillas ordered by: Swapna Rivers          Review of Systems   Constitutional: Negative  HENT: Negative  Respiratory: Positive for chest tightness and shortness of breath  Cardiovascular: Positive for leg swelling  Gastrointestinal: Negative  Genitourinary: Negative  Neurological: Negative  Historical Information   Past Medical History:   Diagnosis Date    Anxiety     Depression     Hypertension      Past Surgical History:   Procedure Laterality Date    HUMERUS FRACTURE SURGERY       Social History   History   Alcohol Use No     History   Drug Use No     History   Smoking Status    Current Every Day Smoker    Packs/day: 1 00    Types: Cigarettes   Smokeless Tobacco    Never Used     Occupational History: Noncontributory    Family History: non-contributory    Meds/Allergies   all current active meds have been reviewed and pertinent pulmonary meds have been reviewed    Allergies   Allergen Reactions    Buspar [Buspirone] Rash    Other Rash     Surgical tape       Objective   Vitals: Blood pressure 110/65, pulse 65, temperature 97 6 °F (36 4 °C), temperature source Oral, resp  rate 18, height 5' 6" (1 676 m), weight 127 kg (279 lb 1 6 oz), last menstrual period 11/08/2017, SpO2 97 %  ,Body mass index is 45 05 kg/m²      Intake/Output Summary (Last 24 hours) at 11/14/17 1106  Last data filed at 11/13/17 1947   Gross per 24 hour   Intake              640 ml   Output                0 ml   Net              640 ml     Invasive Devices     Peripheral Intravenous Line            Peripheral IV 11/12/17 Left Hand 1 day                Physical Exam   Constitutional: She is oriented to person, place, and time  She appears well-developed and well-nourished  No distress  Neck: Normal range of motion  Neck supple  Cardiovascular: Normal rate and regular rhythm  Pulmonary/Chest: No accessory muscle usage  No respiratory distress  She has no decreased breath sounds  She has no wheezes  She has no rhonchi  She has no rales  Musculoskeletal: She exhibits no edema  Neurological: She is alert and oriented to person, place, and time  Lab Results: I have personally reviewed pertinent lab results  , CBC: No results found for: WBC, HGB, HCT, MCV, PLT, ADJUSTEDWBC, MCH, MCHC, RDW, MPV, NRBC, CMP: No results found for: NA, K, CL, CO2, ANIONGAP, BUN, CREATININE, GLUCOSE, CALCIUM, AST, ALT, ALKPHOS, PROT, ALBUMIN, BILITOT, EGFR  Imaging Studies: I have personally reviewed pertinent reports  and I have personally reviewed pertinent films in PACS  EKG, Pathology, and Other Studies: I have personally reviewed pertinent reports      VTE Prophylaxis: Heparin    Code Status: Level 1 - Full Code  Advance Directive and Living Will:      Power of :    POLST:

## 2017-11-14 NOTE — PROGRESS NOTES
[facility] Progress Note  Patient: Dali Lane 36 y o  female   MRN: [id number]  PCP: Anabela Pandya DO  Unit/Bed#: -01 Encounter: [id number]  Date Of Visit: 11/14/17    Assessment/Plan:    Principal Problem:    Acute deep vein thrombosis (DVT) of lower extremity ([address] )  Active Problems:    Presumed pulmonary embolus    HTN (hypertension)    Tobacco abuse    Depression    Right humeral fracture s/p repair with external fixator    Morbid obesity ([address] )    Present on Admission:   Acute deep vein thrombosis (DVT) of lower extremity ([address] )   Presumed pulmonary embolus   HTN (hypertension)   Tobacco abuse   Depression   Right humeral fracture s/p repair with external fixator   Morbid [facility] )    · 1  Acute DVT left lower leg, being treated for a presumed pulmonary embolism from the ICU, currently there would be no indication to do a CT PE, patient has been on a heparin drip, on Coumadin now with overlap goal, will discuss with Pulmonary if the patient considering her morbid obesity would still qualify for the novel agents  I spoke regarding this with the patient as well  · Recent of surgery, humeral fracture, external fixator, patient requesting an orthopedics to see the patient while here in the hospital   Will request on orthopedics evaluation  · Morbid obesity nutritional counseling  · Essential hypertension, continue with the current medications DC fluids the patient is eating, wants to be put on a regular diet  · Tobacco abuse, encouraged to quit  · Will give her 10 mg of Coumadin tonight and check INR tomorrow      VTE Pharmacologic Prophylaxis:   Pharmacologic: Heparin Drip  Mechanical VTE Prophylaxis in Place: Only a right leg    Patient Centered Rounds: I have performed bedside rounds with nursing staff today      Discussions with Specialists or Other Care Team Provider:  Yes    Education and Discussions with Family / Patient:  Yes        Current Length of Stay: 4 day(s)    Current Patient Status: Inpatient   Certification Statement: The patient will continue to require additional inpatient hospital stay due to IV heparin/Coumadin    Discharge Plan:  Home when stable  Code Status: Level 1 - Full Code      Subjective:   Overall has no complaints, the patient mentions she is familiar with Coumadin, a spoke with her in detail regarding the plan  Objective:     Vitals:   Temp (24hrs), Av 6 °F (36 4 °C), Min:97 5 °F (36 4 °C), Max:97 6 °F (36 4 °C)    HR:  [65-67] 65  Resp:  [18] 18  BP: (110-125)/(59-65) 110/65  SpO2:  [97 %-100 %] 97 %  Body mass index is 45 05 kg/m²  Input and Output Summary (last 24 hours): Intake/Output Summary (Last 24 hours) at 17 0837  Last data filed at 17 1947   Gross per 24 hour   Intake              640 ml   Output                0 ml   Net              640 ml           Physical Exam:   General,  no acute distress  CVS S1-S2 heard no rubs murmurs  Chest clear to auscultation at this time no crackles  Abdomen obese nontender  Neuro overall seems nonfocal  Extremities no lower extremity edema external fixator right arm  Had INT no pallor cyanosis      Additional Data:     Labs:      Results from last 7 days  Lab Units 17  0507   WBC Thousand/uL 14 86*   HEMOGLOBIN g/dL 11 3*   HEMATOCRIT % 35 4   PLATELETS Thousands/uL 243   NEUTROS PCT % 54   LYMPHS PCT % 37   MONOS PCT % 5   EOS PCT % 3       Results from last 7 days  Lab Units 17  0507   SODIUM mmol/L 140   POTASSIUM mmol/L 3 8   CHLORIDE mmol/L 105   CO2 mmol/L 26   BUN mg/dL 12   CREATININE mg/dL 0 85   CALCIUM mg/dL 8 0*   TOTAL PROTEIN g/dL 6 3*   BILIRUBIN TOTAL mg/dL 0 40   ALK PHOS U/L 76   ALT U/L 13   AST U/L 12   GLUCOSE RANDOM mg/dL 140       Results from last 7 days  Lab Units 17  0433   INR  1 29*       * I Have Reviewed All Lab Data Listed Above  * Additional Pertinent Lab Tests Reviewed:  All Labs Within Last 24 Hours Reviewed      Recent Cultures (last 7 days):           Last 24 Hours Medication List:     amoxicillin-clavulanate 1 tablet Oral Q12H [address]   bacitracin 1 small application Topical Once per day on Mon Thu   cholecalciferol 1,000 Units Oral Weekly   citalopram 40 mg Oral Daily   clonazePAM 0 5 mg Oral Daily   losartan 50 mg Oral BID   warfarin 10 mg Oral Daily (warfarin)        Today, Patient Was Seen By: Deonna Merrill MD    ** Please Note: Dragon 360 Dictation voice to text software may have been used in the creation of this document   **

## 2017-11-15 VITALS
DIASTOLIC BLOOD PRESSURE: 72 MMHG | OXYGEN SATURATION: 96 % | HEART RATE: 64 BPM | TEMPERATURE: 98 F | SYSTOLIC BLOOD PRESSURE: 134 MMHG | RESPIRATION RATE: 18 BRPM | BODY MASS INDEX: 44.04 KG/M2 | WEIGHT: 274.03 LBS | HEIGHT: 66 IN

## 2017-11-15 LAB
ANION GAP SERPL CALCULATED.3IONS-SCNC: 7 MMOL/L (ref 4–13)
APTT PPP: >210 SECONDS (ref 23–35)
ATRIAL RATE: 54 BPM
ATRIAL RATE: 57 BPM
BUN SERPL-MCNC: 13 MG/DL (ref 5–25)
CALCIUM SERPL-MCNC: 8.9 MG/DL (ref 8.3–10.1)
CARDIOLIPIN IGA SER IA-ACNC: <9 APL U/ML (ref 0–11)
CARDIOLIPIN IGG SER IA-ACNC: <9 GPL U/ML (ref 0–14)
CARDIOLIPIN IGM SER IA-ACNC: <9 MPL U/ML (ref 0–12)
CHLORIDE SERPL-SCNC: 103 MMOL/L (ref 100–108)
CO2 SERPL-SCNC: 29 MMOL/L (ref 21–32)
CREAT SERPL-MCNC: 0.91 MG/DL (ref 0.6–1.3)
ERYTHROCYTE [DISTWIDTH] IN BLOOD BY AUTOMATED COUNT: 14.6 % (ref 11.6–15.1)
GFR SERPL CREATININE-BSD FRML MDRD: 78 ML/MIN/1.73SQ M
GLUCOSE SERPL-MCNC: 123 MG/DL (ref 65–140)
HCT VFR BLD AUTO: 33.7 % (ref 34.8–46.1)
HGB BLD-MCNC: 11.1 G/DL (ref 11.5–15.4)
INR PPP: 1.47 (ref 0.86–1.16)
MCH RBC QN AUTO: 28.3 PG (ref 26.8–34.3)
MCHC RBC AUTO-ENTMCNC: 32.9 G/DL (ref 31.4–37.4)
MCV RBC AUTO: 86 FL (ref 82–98)
P AXIS: 61 DEGREES
P AXIS: 62 DEGREES
PLATELET # BLD AUTO: 282 THOUSANDS/UL (ref 149–390)
PMV BLD AUTO: 9.8 FL (ref 8.9–12.7)
POTASSIUM SERPL-SCNC: 4.1 MMOL/L (ref 3.5–5.3)
PR INTERVAL: 158 MS
PR INTERVAL: 160 MS
PROTHROMBIN TIME: 18.2 SECONDS (ref 12.1–14.4)
QRS AXIS: 13 DEGREES
QRS AXIS: 14 DEGREES
QRSD INTERVAL: 94 MS
QRSD INTERVAL: 96 MS
QT INTERVAL: 460 MS
QT INTERVAL: 462 MS
QTC INTERVAL: 436 MS
QTC INTERVAL: 449 MS
RBC # BLD AUTO: 3.92 MILLION/UL (ref 3.81–5.12)
SODIUM SERPL-SCNC: 139 MMOL/L (ref 136–145)
T WAVE AXIS: 23 DEGREES
T WAVE AXIS: 25 DEGREES
VENTRICULAR RATE: 54 BPM
VENTRICULAR RATE: 57 BPM
WBC # BLD AUTO: 13.19 THOUSAND/UL (ref 4.31–10.16)

## 2017-11-15 PROCEDURE — 85610 PROTHROMBIN TIME: CPT | Performed by: INTERNAL MEDICINE

## 2017-11-15 PROCEDURE — 80048 BASIC METABOLIC PNL TOTAL CA: CPT | Performed by: INTERNAL MEDICINE

## 2017-11-15 PROCEDURE — 85027 COMPLETE CBC AUTOMATED: CPT | Performed by: INTERNAL MEDICINE

## 2017-11-15 PROCEDURE — 93005 ELECTROCARDIOGRAM TRACING: CPT

## 2017-11-15 PROCEDURE — 93005 ELECTROCARDIOGRAM TRACING: CPT | Performed by: NURSE PRACTITIONER

## 2017-11-15 PROCEDURE — 85730 THROMBOPLASTIN TIME PARTIAL: CPT | Performed by: INTERNAL MEDICINE

## 2017-11-15 RX ORDER — SACCHAROMYCES BOULARDII 250 MG
250 CAPSULE ORAL 2 TIMES DAILY
Qty: 20 CAPSULE | Refills: 0 | Status: SHIPPED | OUTPATIENT
Start: 2017-11-15 | End: 2019-02-24

## 2017-11-15 RX ADMIN — CITALOPRAM HYDROBROMIDE 40 MG: 20 TABLET ORAL at 08:15

## 2017-11-15 RX ADMIN — LOSARTAN POTASSIUM 50 MG: 50 TABLET, FILM COATED ORAL at 08:15

## 2017-11-15 RX ADMIN — AMOXICILLIN AND CLAVULANATE POTASSIUM 1 TABLET: 500; 125 TABLET, FILM COATED ORAL at 08:15

## 2017-11-15 RX ADMIN — HEPARIN SODIUM AND DEXTROSE 21 UNITS/KG/HR: 10000; 5 INJECTION INTRAVENOUS at 00:33

## 2017-11-15 RX ADMIN — CLONAZEPAM 0.5 MG: 0.5 TABLET ORAL at 08:15

## 2017-11-15 RX ADMIN — Medication 250 MG: at 08:15

## 2017-11-15 NOTE — SOCIAL WORK
CM intern met with patient at bedside  Patient is aware of the $3 copay for her Xarelto prescription  Patient has transportation arranged and is continuing to decline VNA referral  Patient does not have any other needs and has been discharged at this time

## 2017-11-15 NOTE — PROGRESS NOTES
Progress Note - Pulmonary   Hershall Needsylvia Lane 36 y o  female MRN: [id number]  Unit/Bed#: -01 Encounter: [id number]    Assessment:  1  Acute bilateral PE with associated right heart strain  2  Acute DVT  3  Likely sleep apnea    Plan:  1  Acute DVT, presumed PE in pt with LOONEY and right heart strain  - Patient with bilateral PE, LLE DVT with right heart strain, likely provoked from recent surgery  - Length of treatment will be at least 3 months  - She has not been hypoxic, her BP has been stable, no tachycardia    - Echo shows dilated right ventricle with PAP at least 30  She does also likely have sleep apnea which could contribute to the right heart findings  Would do a repeat echo in 3 months to reassess  2  Likely sleep apnea  - Witnessed apneic episodes, snoring, excessive daytime sleepiness and body habitus all highly suspicious for sleep apnea, she was scheduled for a sleep study next week  - Could be a contributing factor in her pulm HTN  - Should follow up with us as outpatient    Stable for discharge home today, follow up with us in 1-2 weeks  Subjective:   Patient still with some shortness of breath with exertion  Ready to go home  Objective:     Vitals: Blood pressure 134/72, pulse 64, temperature 98 °F (36 7 °C), temperature source Oral, resp  rate 18, height 5' 6" (1 676 m), weight 124 kg (274 lb 0 5 oz), last menstrual period 11/08/2017, SpO2 96 %  ,Body mass index is 44 23 kg/m²        Intake/Output Summary (Last 24 hours) at 11/15/17 1000  Last data filed at 11/15/17 0900   Gross per 24 hour   Intake              740 ml   Output              350 ml   Net              390 ml       Invasive Devices     Peripheral Intravenous Line            Peripheral IV 11/14/17 Left Antecubital less than 1 day                Physical Exam: General appearance: alert, appears stated age and cooperative  Lungs: clear to auscultation bilaterally  Heart: regular rate and rhythm and S1, S2 normal  Extremities: extremities normal, atraumatic, no cyanosis or edema  Neurologic: Mental status: Alert, oriented, thought content appropriate     Labs: I have personally reviewed pertinent lab results  , CBC:   Lab Results   Component Value Date    WBC 13 19 (H) 11/15/2017    HGB 11 1 (L) 11/15/2017    HCT 33 7 (L) 11/15/2017    MCV 86 11/15/2017     11/15/2017    MCH 28 3 11/15/2017    MCHC 32 9 11/15/2017    RDW 14 6 11/15/2017    MPV 9 8 11/15/2017   , CMP:   Lab Results   Component Value Date     11/15/2017    K 4 1 11/15/2017     11/15/2017    CO2 29 11/15/2017    ANIONGAP 7 11/15/2017    BUN 13 11/15/2017    CREATININE 0 91 11/15/2017    GLUCOSE 123 11/15/2017    CALCIUM 8 9 11/15/2017    EGFR 78 11/15/2017     Imaging and other studies: I have personally reviewed pertinent reports     and I have personally reviewed pertinent films in PACS

## 2017-11-15 NOTE — DISCHARGE SUMMARY
Discharge Summary - [facility] Internal Medicine    Patient Information: Yana Lane 36 y o  female MRN: [id number]  Unit/Bed#: -01 Encounter: [id number]    Discharging Physician / Practitioner: Sree Mckeon MD  PCP: Rosi Muse DO  Admission Date: 11/10/2017  Discharge Date: 11/15/17    Reason for Admission: dvt/pe    Discharge Diagnoses:     Principal Problem:    Acute deep vein thrombosis (DVT) of lower extremity ([address] )  Active Problems:    Presumed pulmonary embolus    HTN (hypertension)    Tobacco abuse    Depression    Right humeral fracture s/p repair with external fixator    Morbid obesity ([facility] [address] )  Resolved Problems:    * No resolved hospital problems  *      Consultations During Hospital Stay:  Pulmonary    Hospital Course:     Vanesa Henry is a 36 y o  female patient who originally presented to the hospital on 11/10/2017 due to [address], CHEST PAIN MILD ELEVATION OF TROPONIN, HISTORY OF FRACTURED HUMERUS 3 WEEKS PRIOR TO PRESENTATION STARTED HAVING LEFT-SIDED LEG PAIN, AND ASSOCIATED CHEST TIGHTNESS THE AS PER HISTORY AND PHYSICAL, found to have an acute DVT left lower leg was started on a heparin drip initially because she was found to have right heart strain with dilated right ventricle on echocardiogram patient went to the step-down was transferred to the medical floor, patient did not have a CT per PE done initially but was treated empirically because of the respiratory distress and the finding of a DVT, underwent a CT PE yesterday, which revealed extensive bilateral pulmonary emboli right greater than left, are B12 E ratio was 1, 3 mm right upper lobe nodule, also 5 9 cm left adrenal mass  The patient will follow with Pulmonary outpatient at this time have had a detailed discussion with them and it is okay to switch her to novel anticoagulants  Will put her on Xarelto  She likely also has sleep apnea will require outpatient PFTs      I have discussed in detail with the patient regarding the findings of the CT PE, and the need to follow up with her primary care physician for further workup of the adrenal mass  Also she will make an appointment with Pulmonary regarding follow-up of the lung nodule and further workup/follow-up for the pulmonary emboli  Humeral  fracture the patient was evaluated by Orthopedics in the hospital, this time plan is for outpatient follow-up with Dr Ilda Cuevas,  Discharge Day Visit / Exam:     Subjective: The patient today feels better would like to go home  Vitals: Blood Pressure: 134/72 (11/14/17 2314)  Pulse: 64 (11/14/17 2314)  Temperature: 98 °F (36 7 °C) (11/14/17 2314)  Temp Source: Oral (11/14/17 2314)  Respirations: 18 (11/14/17 2314)  Height: 5' 6" (167 6 cm) (11/10/17 1240)  Weight - Scale: 124 kg (274 lb 0 5 oz) (11/15/17 0600)  SpO2: 96 % (11/14/17 2314)  Exam:   Physical Exam   Constitutional: She is oriented to person, place, and time  No distress  HENT:   Head: Normocephalic and atraumatic  Eyes: Pupils are equal, round, and reactive to light  Neck: No tracheal deviation present  Cardiovascular: Normal rate  No murmur heard  Pulmonary/Chest: No respiratory distress  She has no wheezes  Abdominal: She exhibits no distension  There is no tenderness  Musculoskeletal: She exhibits no edema  Neurological: She is alert and oriented to person, place, and time  Skin: She is not diaphoretic  Discharge instructions/Information to patient and family:   See after visit summary for information provided to patient and family  Provisions for Follow-Up Care:  See after visit summary for information related to follow-up care and any pertinent home health orders  Disposition:     Home    ·     Planned Readmission: no     Discharge Statement:  I spent 32 minutes discharging the patient  This time was spent on the day of discharge  I had direct contact with the patient on the day of discharge   Greater than 50% of the total time was spent examining patient, answering all patient questions, arranging and discussing plan of care with patient as well as directly providing post-discharge instructions  Additional time then spent on discharge activities  Discharge Medications:  See after visit summary for reconciled discharge medications provided to patient and family        ** Please Note: This note has been constructed using a voice recognition system **

## 2017-11-16 LAB
B2 GLYCOPROT1 IGA SER-ACNC: <9 GPI IGA UNITS (ref 0–25)
B2 GLYCOPROT1 IGG SER-ACNC: <9 GPI IGG UNITS (ref 0–20)
B2 GLYCOPROT1 IGM SER-ACNC: <9 GPI IGM UNITS (ref 0–32)
PROT C AG ACT/NOR PPP IA: 33 % OF NORMAL (ref 60–150)
PROT S ACT/NOR PPP: 111 % (ref 57–157)
PROT S ACT/NOR PPP: 83 % (ref 63–140)
PROT S PPP-ACNC: 80 % (ref 60–150)

## 2017-11-20 LAB
COMMENT: NORMAL
F5 GENE MUT ANL BLD/T: NORMAL

## 2017-11-21 LAB
F2 GENE MUT ANL BLD/T: NORMAL
Lab: NORMAL

## 2017-11-29 ENCOUNTER — GENERIC CONVERSION - ENCOUNTER (OUTPATIENT)
Dept: OTHER | Facility: OTHER | Age: 42
End: 2017-11-29

## 2017-11-30 LAB
APTT SCREEN TO CONFIRM RATIO: 0.89 RATIO (ref 0–1.4)
CONFIRM APTT/NORMAL: 57.4 SEC (ref 0–55)
LA PPP-IMP: ABNORMAL
SCREEN APTT: 40.3 SEC (ref 0–51.9)
SCREEN DRVVT: 41.5 SEC (ref 0–47)
THROMBIN TIME: 36.4 SEC (ref 0–23)
TT IMM NP PPP: 29.7 SEC (ref 0–23)
TT P HPASE PPP: 21.2 SEC (ref 0–23)

## 2018-01-22 VITALS
WEIGHT: 273 LBS | RESPIRATION RATE: 18 BRPM | DIASTOLIC BLOOD PRESSURE: 68 MMHG | BODY MASS INDEX: 43.87 KG/M2 | OXYGEN SATURATION: 97 % | SYSTOLIC BLOOD PRESSURE: 112 MMHG | HEART RATE: 72 BPM | TEMPERATURE: 97.7 F | HEIGHT: 66 IN

## 2018-02-16 ENCOUNTER — TRANSCRIBE ORDERS (OUTPATIENT)
Dept: ADMINISTRATIVE | Facility: HOSPITAL | Age: 43
End: 2018-02-16

## 2018-02-16 DIAGNOSIS — T81.718S IATROGENIC PULMONARY EMBOLISM AND INFARCTION, SEQUELA (HCC): Primary | ICD-10-CM

## 2018-02-16 DIAGNOSIS — I26.99 IATROGENIC PULMONARY EMBOLISM AND INFARCTION, SEQUELA (HCC): Primary | ICD-10-CM

## 2018-02-23 ENCOUNTER — OFFICE VISIT (OUTPATIENT)
Dept: PULMONOLOGY | Facility: CLINIC | Age: 43
End: 2018-02-23
Payer: COMMERCIAL

## 2018-02-23 VITALS
SYSTOLIC BLOOD PRESSURE: 114 MMHG | DIASTOLIC BLOOD PRESSURE: 80 MMHG | WEIGHT: 273 LBS | HEIGHT: 66 IN | HEART RATE: 83 BPM | BODY MASS INDEX: 43.87 KG/M2 | OXYGEN SATURATION: 96 %

## 2018-02-23 DIAGNOSIS — Z72.0 TOBACCO ABUSE: ICD-10-CM

## 2018-02-23 DIAGNOSIS — I26.99 OTHER PULMONARY EMBOLISM WITHOUT ACUTE COR PULMONALE, UNSPECIFIED CHRONICITY (HCC): Primary | ICD-10-CM

## 2018-02-23 DIAGNOSIS — E66.01 MORBID OBESITY (HCC): ICD-10-CM

## 2018-02-23 PROCEDURE — 99213 OFFICE O/P EST LOW 20 MIN: CPT | Performed by: PHYSICIAN ASSISTANT

## 2018-02-23 PROCEDURE — 99406 BEHAV CHNG SMOKING 3-10 MIN: CPT | Performed by: PHYSICIAN ASSISTANT

## 2018-02-23 RX ORDER — MAGNESIUM GLUCONATE 30 MG(550)
30 TABLET ORAL
COMMUNITY
End: 2019-02-24

## 2018-02-23 RX ORDER — BUPROPION HYDROCHLORIDE 150 MG/1
TABLET, EXTENDED RELEASE ORAL
Qty: 60 TABLET | Refills: 1 | Status: SHIPPED | OUTPATIENT
Start: 2018-02-23 | End: 2019-02-24

## 2018-02-23 RX ORDER — CITALOPRAM 40 MG/1
TABLET ORAL
COMMUNITY
Start: 2016-12-16 | End: 2019-02-24

## 2018-02-23 RX ORDER — METOPROLOL SUCCINATE 50 MG/1
TABLET, EXTENDED RELEASE ORAL
COMMUNITY
End: 2020-09-21 | Stop reason: SDUPTHER

## 2018-02-23 RX ORDER — LOSARTAN POTASSIUM 50 MG/1
TABLET ORAL
COMMUNITY
Start: 2017-08-08

## 2018-02-23 RX ORDER — ERGOCALCIFEROL (VITAMIN D2) 1250 MCG
50000 CAPSULE ORAL
COMMUNITY
End: 2020-09-21

## 2018-02-23 RX ORDER — CLONAZEPAM 0.5 MG/1
0.5 TABLET ORAL 2 TIMES DAILY
COMMUNITY
Start: 2016-12-16 | End: 2019-02-24

## 2018-02-23 NOTE — PROGRESS NOTES
Assessment:    1  Other pulmonary embolism without acute cor pulmonale, unspecified chronicity (HCC)  Protein C and S Activity With Reflex To Protein C and/or S Antigen   2  Tobacco abuse  buPROPion (WELLBUTRIN SR) 150 mg 12 hr tablet   3  Morbid obesity ([address] )         Plan: 39 y o  female actively smoking 1/2 PPD with about a 15 pack year smoking history with significant past medical history of a right humeral fracture 2 years ago status post four surgeries (most recent surgery performed 10/2017) who presents for follow-up of bilateral PE and left DVT  Of note, patient was hospitalized bilateral pulmonary emboli and acute DVT of the left leg from 11/10/2017-11/15/2017 and started on xarelto  1   Bilateral pulmonary emboli, resolved - patient has completed 3 months of anticoagulation with xarelto for provoked bilateral PE and acute DVT of the left leg  Repeat CT of chest with no evidence of pulmonary embolism  Patient is safe to discontinue xarelto  Hypercoagulable workup was essentially negative except for decreased protein C level to 30% of normal  Advised patient to complete blood work in 3 weeks after being off anticoagulation to assess for normalization  Advised patient to contact our office with the name and fax number of her surgeons  2   Tobacco abuse -strongly encouraged patient to quit smoking  She is currently smoking half a pack a day  She will attempt to quit  Discussed options for smoking cessation  She would like to try Wellbutrin given her underlying depression  Of note she is currently on Celexa  Discussed side effects of medication and advised her against drinking alcohol  Subjective:     Patient ID: Beatriz Robbins is a 39 y o  female      Chief Complaint:   Broken arm    HPI  39 y o  female actively smoking 1/2 PPD with about a 15 pack year smoking history with significant past medical history of a right humeral fracture 2 years ago status post four surgeries (most recent surgery performed 10/2017) who presents for follow-up of bilateral PE and left DVT  Of note, patient was hospitalized bilateral pulmonary emboli and acute DVT of the left leg from 11/10/2017-11/15/2017 and put on xarelto  Patient has been on xarelto since  Patient states she needs to have 2 surgeries done  However, neither of these can be completed until she is off the xarelto  She needs to have surgeries for repair of her right humeral fracture and removal of the mass on her adrenal gland  Denies any shortness of breath, cough, fever, chills, chest pain  Review of Systems  Review of Systems   Constitution: Negative for chills, decreased appetite, fever, malaise/fatigue and weight gain  HENT: Negative for congestion  Eyes: Negative for visual disturbance  Cardiovascular: Negative for dyspnea on exertion, leg swelling and orthopnea  Respiratory: Negative for cough, shortness of breath, sleep disturbances due to breathing and wheezing  Hematologic/Lymphatic: Negative for adenopathy  Skin: Negative for rash  Musculoskeletal: Positive for joint pain and joint swelling  Gastrointestinal: Negative for abdominal pain, diarrhea, nausea and vomiting  Neurological: Negative for excessive daytime sleepiness and seizures  Psychiatric/Behavioral: Positive for depression  Negative for altered mental status, hallucinations and suicidal ideas  Allergic/Immunologic: Negative for environmental allergies  Objective:  /80 (BP Location: Left arm, Patient Position: Sitting)   Pulse 83   Ht 5' 6" (1 676 m)   Wt 124 kg (273 lb)   SpO2 96%   BMI 44 06 kg/m²     Physical Exam   Constitutional: She is oriented to person, place, and time  She appears well-developed  Morbidly obese   HENT:   Head: Normocephalic and atraumatic  Neck: Normal range of motion  Cardiovascular: Normal rate and regular rhythm  No murmur heard  Pulmonary/Chest: Effort normal  She has no wheezes  She has no rales  Diminished breath sounds bilaterally from large body habitus   Abdominal: Soft  There is no tenderness  Musculoskeletal: Normal range of motion  She exhibits no edema or tenderness  Lymphadenopathy:     She has no cervical adenopathy  Neurological: She is alert and oriented to person, place, and time  Skin: Skin is warm and dry  Psychiatric: She has a normal mood and affect  Her behavior is normal        Lab/Image Review: Reviewed all pertinent labs/radiology results  Past Medical History:   Diagnosis Date    Anxiety     Depression     Hypertension        Social History   Substance Use Topics    Smoking status: Current Every Day Smoker     Packs/day: 10 00     Types: Cigarettes    Smokeless tobacco: Never Used    Alcohol use No       No family history on file      Patient Active Problem List   Diagnosis    Acute deep vein thrombosis (DVT) of lower extremity (HCC)    Presumed pulmonary embolus    HTN (hypertension)    Tobacco abuse    Depression    Right humeral fracture s/p repair with external fixator    Morbid obesity ([address] )

## 2018-03-01 ENCOUNTER — HOSPITAL ENCOUNTER (OUTPATIENT)
Dept: NON INVASIVE DIAGNOSTICS | Facility: MEDICAL CENTER | Age: 43
Discharge: HOME | End: 2018-03-01
Payer: COMMERCIAL

## 2018-03-01 DIAGNOSIS — I26.99 IATROGENIC PULMONARY EMBOLISM AND INFARCTION, SEQUELA (HCC): ICD-10-CM

## 2018-03-01 DIAGNOSIS — T81.718S IATROGENIC PULMONARY EMBOLISM AND INFARCTION, SEQUELA (HCC): ICD-10-CM

## 2018-03-01 PROCEDURE — 93321 DOPPLER ECHO F-UP/LMTD STD: CPT | Performed by: INTERNAL MEDICINE

## 2018-03-01 PROCEDURE — 93325 DOPPLER ECHO COLOR FLOW MAPG: CPT | Performed by: INTERNAL MEDICINE

## 2018-03-01 PROCEDURE — 93308 TTE F-UP OR LMTD: CPT

## 2018-03-01 PROCEDURE — 93308 TTE F-UP OR LMTD: CPT | Performed by: INTERNAL MEDICINE

## 2018-03-13 ENCOUNTER — TELEPHONE (OUTPATIENT)
Dept: PULMONOLOGY | Facility: CLINIC | Age: 43
End: 2018-03-13

## 2018-03-13 NOTE — TELEPHONE ENCOUNTER
PT CALLED AND ASKED THAT ECHO BE FAXED TO A DR GONGORA (DONE) SHE ALSO WANTED US TO LET HIM KNOW HOW LONG SHE HAS BEEN OFF Y[employer] Mitten  I DO NOT SEE IT MENTIONED IN NOTE FOR HER TO STOP IT  PLEASE ADVISE

## 2018-03-13 NOTE — TELEPHONE ENCOUNTER
On 2/23 Ela Ledesma told her it was ok to stop the Xarelto, so she may have taken her last dose that day

## 2019-02-24 ENCOUNTER — APPOINTMENT (EMERGENCY)
Dept: CT IMAGING | Facility: HOSPITAL | Age: 44
End: 2019-02-24
Payer: COMMERCIAL

## 2019-02-24 ENCOUNTER — HOSPITAL ENCOUNTER (EMERGENCY)
Facility: HOSPITAL | Age: 44
Discharge: HOME | End: 2019-02-24
Attending: EMERGENCY MEDICINE
Payer: COMMERCIAL

## 2019-02-24 VITALS
RESPIRATION RATE: 20 BRPM | HEART RATE: 56 BPM | OXYGEN SATURATION: 95 % | WEIGHT: 268.52 LBS | BODY MASS INDEX: 43.15 KG/M2 | TEMPERATURE: 97.8 F | HEIGHT: 66 IN | SYSTOLIC BLOOD PRESSURE: 119 MMHG | DIASTOLIC BLOOD PRESSURE: 78 MMHG

## 2019-02-24 DIAGNOSIS — K29.70 GASTRITIS: Primary | ICD-10-CM

## 2019-02-24 DIAGNOSIS — R19.7 DIARRHEA: ICD-10-CM

## 2019-02-24 LAB
ALBUMIN SERPL BCP-MCNC: 3.2 G/DL (ref 3.5–5)
ALP SERPL-CCNC: 80 U/L (ref 46–116)
ALT SERPL W P-5'-P-CCNC: 19 U/L (ref 12–78)
ANION GAP SERPL CALCULATED.3IONS-SCNC: 7 MMOL/L (ref 4–13)
AST SERPL W P-5'-P-CCNC: 13 U/L (ref 5–45)
BACTERIA UR QL AUTO: ABNORMAL /HPF
BASOPHILS # BLD AUTO: 0.09 THOUSANDS/ΜL (ref 0–0.1)
BASOPHILS NFR BLD AUTO: 1 % (ref 0–1)
BILIRUB SERPL-MCNC: 0.3 MG/DL (ref 0.2–1)
BILIRUB UR QL STRIP: NEGATIVE
BUN SERPL-MCNC: 12 MG/DL (ref 5–25)
CALCIUM SERPL-MCNC: 9.4 MG/DL (ref 8.3–10.1)
CHLORIDE SERPL-SCNC: 94 MMOL/L (ref 100–108)
CLARITY UR: ABNORMAL
CO2 SERPL-SCNC: 28 MMOL/L (ref 21–32)
COLOR UR: YELLOW
CREAT SERPL-MCNC: 0.9 MG/DL (ref 0.6–1.3)
EOSINOPHIL # BLD AUTO: 0.19 THOUSAND/ΜL (ref 0–0.61)
EOSINOPHIL NFR BLD AUTO: 1 % (ref 0–6)
ERYTHROCYTE [DISTWIDTH] IN BLOOD BY AUTOMATED COUNT: 14.6 % (ref 11.6–15.1)
EXT PREG TEST URINE: NEGATIVE
GFR SERPL CREATININE-BSD FRML MDRD: 77 ML/MIN/1.73SQ M
GLUCOSE SERPL-MCNC: 133 MG/DL (ref 65–140)
GLUCOSE UR STRIP-MCNC: NEGATIVE MG/DL
HCT VFR BLD AUTO: 46.2 % (ref 34.8–46.1)
HGB BLD-MCNC: 15.1 G/DL (ref 11.5–15.4)
HGB UR QL STRIP.AUTO: ABNORMAL
HYALINE CASTS #/AREA URNS LPF: ABNORMAL /LPF
IMM GRANULOCYTES # BLD AUTO: 0.05 THOUSAND/UL (ref 0–0.2)
IMM GRANULOCYTES NFR BLD AUTO: 0 % (ref 0–2)
KETONES UR STRIP-MCNC: ABNORMAL MG/DL
LEUKOCYTE ESTERASE UR QL STRIP: NEGATIVE
LIPASE SERPL-CCNC: 82 U/L (ref 73–393)
LYMPHOCYTES # BLD AUTO: 2.48 THOUSANDS/ΜL (ref 0.6–4.47)
LYMPHOCYTES NFR BLD AUTO: 18 % (ref 14–44)
MCH RBC QN AUTO: 27.7 PG (ref 26.8–34.3)
MCHC RBC AUTO-ENTMCNC: 32.7 G/DL (ref 31.4–37.4)
MCV RBC AUTO: 85 FL (ref 82–98)
MONOCYTES # BLD AUTO: 0.69 THOUSAND/ΜL (ref 0.17–1.22)
MONOCYTES NFR BLD AUTO: 5 % (ref 4–12)
MUCOUS THREADS UR QL AUTO: ABNORMAL
NEUTROPHILS # BLD AUTO: 10.48 THOUSANDS/ΜL (ref 1.85–7.62)
NEUTS SEG NFR BLD AUTO: 75 % (ref 43–75)
NITRITE UR QL STRIP: NEGATIVE
NON-SQ EPI CELLS URNS QL MICRO: ABNORMAL /HPF
NRBC BLD AUTO-RTO: 0 /100 WBCS
PH UR STRIP.AUTO: 8.5 [PH] (ref 4.5–8)
PLATELET # BLD AUTO: 388 THOUSANDS/UL (ref 149–390)
PMV BLD AUTO: 10.1 FL (ref 8.9–12.7)
POTASSIUM SERPL-SCNC: 3.7 MMOL/L (ref 3.5–5.3)
PROT SERPL-MCNC: 7.9 G/DL (ref 6.4–8.2)
PROT UR STRIP-MCNC: ABNORMAL MG/DL
RBC # BLD AUTO: 5.45 MILLION/UL (ref 3.81–5.12)
RBC #/AREA URNS AUTO: ABNORMAL /HPF
SODIUM SERPL-SCNC: 129 MMOL/L (ref 136–145)
SP GR UR STRIP.AUTO: 1.01 (ref 1–1.03)
UROBILINOGEN UR QL STRIP.AUTO: 1 E.U./DL
WBC # BLD AUTO: 13.98 THOUSAND/UL (ref 4.31–10.16)
WBC #/AREA URNS AUTO: ABNORMAL /HPF

## 2019-02-24 PROCEDURE — 96361 HYDRATE IV INFUSION ADD-ON: CPT

## 2019-02-24 PROCEDURE — 74177 CT ABD & PELVIS W/CONTRAST: CPT

## 2019-02-24 PROCEDURE — 96374 THER/PROPH/DIAG INJ IV PUSH: CPT

## 2019-02-24 PROCEDURE — 81025 URINE PREGNANCY TEST: CPT | Performed by: EMERGENCY MEDICINE

## 2019-02-24 PROCEDURE — 36415 COLL VENOUS BLD VENIPUNCTURE: CPT

## 2019-02-24 PROCEDURE — 81001 URINALYSIS AUTO W/SCOPE: CPT | Performed by: EMERGENCY MEDICINE

## 2019-02-24 PROCEDURE — 96375 TX/PRO/DX INJ NEW DRUG ADDON: CPT

## 2019-02-24 PROCEDURE — 80053 COMPREHEN METABOLIC PANEL: CPT | Performed by: EMERGENCY MEDICINE

## 2019-02-24 PROCEDURE — 85025 COMPLETE CBC W/AUTO DIFF WBC: CPT | Performed by: EMERGENCY MEDICINE

## 2019-02-24 PROCEDURE — 83690 ASSAY OF LIPASE: CPT | Performed by: EMERGENCY MEDICINE

## 2019-02-24 PROCEDURE — 99284 EMERGENCY DEPT VISIT MOD MDM: CPT

## 2019-02-24 RX ORDER — DICYCLOMINE HCL 20 MG
20 TABLET ORAL ONCE
Status: COMPLETED | OUTPATIENT
Start: 2019-02-24 | End: 2019-02-24

## 2019-02-24 RX ORDER — ONDANSETRON 2 MG/ML
4 INJECTION INTRAMUSCULAR; INTRAVENOUS ONCE
Status: COMPLETED | OUTPATIENT
Start: 2019-02-24 | End: 2019-02-24

## 2019-02-24 RX ORDER — HYDROMORPHONE HCL/PF 1 MG/ML
1 SYRINGE (ML) INJECTION ONCE
Status: COMPLETED | OUTPATIENT
Start: 2019-02-24 | End: 2019-02-24

## 2019-02-24 RX ORDER — FAMOTIDINE 20 MG/1
20 TABLET, FILM COATED ORAL 2 TIMES DAILY
Qty: 30 TABLET | Refills: 0 | Status: SHIPPED | OUTPATIENT
Start: 2019-02-24 | End: 2020-04-17 | Stop reason: SDDI

## 2019-02-24 RX ORDER — SODIUM CHLORIDE 9 MG/ML
1000 INJECTION, SOLUTION INTRAVENOUS ONCE
Status: COMPLETED | OUTPATIENT
Start: 2019-02-24 | End: 2019-02-24

## 2019-02-24 RX ORDER — MAGNESIUM HYDROXIDE/ALUMINUM HYDROXICE/SIMETHICONE 120; 1200; 1200 MG/30ML; MG/30ML; MG/30ML
30 SUSPENSION ORAL ONCE
Status: COMPLETED | OUTPATIENT
Start: 2019-02-24 | End: 2019-02-24

## 2019-02-24 RX ORDER — DICYCLOMINE HCL 20 MG
20 TABLET ORAL 2 TIMES DAILY
Qty: 20 TABLET | Refills: 0 | Status: SHIPPED | OUTPATIENT
Start: 2019-02-24 | End: 2020-04-17 | Stop reason: ALTCHOICE

## 2019-02-24 RX ORDER — ASPIRIN 81 MG/1
81 TABLET, CHEWABLE ORAL DAILY
COMMUNITY

## 2019-02-24 RX ORDER — ONDANSETRON 4 MG/1
4 TABLET, ORALLY DISINTEGRATING ORAL EVERY 8 HOURS PRN
Qty: 20 TABLET | Refills: 0 | Status: SHIPPED | OUTPATIENT
Start: 2019-02-24 | End: 2020-04-17 | Stop reason: ALTCHOICE

## 2019-02-24 RX ADMIN — DICYCLOMINE HYDROCHLORIDE 20 MG: 20 TABLET ORAL at 11:27

## 2019-02-24 RX ADMIN — SODIUM CHLORIDE 1000 ML/HR: 0.9 INJECTION, SOLUTION INTRAVENOUS at 10:08

## 2019-02-24 RX ADMIN — SODIUM CHLORIDE 1000 ML: 0.9 INJECTION, SOLUTION INTRAVENOUS at 08:40

## 2019-02-24 RX ADMIN — HYDROMORPHONE HYDROCHLORIDE 1 MG: 1 INJECTION, SOLUTION INTRAMUSCULAR; INTRAVENOUS; SUBCUTANEOUS at 09:20

## 2019-02-24 RX ADMIN — ALUMINUM HYDROXIDE, MAGNESIUM HYDROXIDE, AND SIMETHICONE 30 ML: 200; 200; 20 SUSPENSION ORAL at 11:27

## 2019-02-24 RX ADMIN — FAMOTIDINE 20 MG: 10 INJECTION, SOLUTION INTRAVENOUS at 11:27

## 2019-02-24 RX ADMIN — IOHEXOL 100 ML: 350 INJECTION, SOLUTION INTRAVENOUS at 09:48

## 2019-02-24 RX ADMIN — ONDANSETRON 4 MG: 2 INJECTION INTRAMUSCULAR; INTRAVENOUS at 09:18

## 2019-02-24 RX ADMIN — LIDOCAINE HYDROCHLORIDE 15 ML: 20 SOLUTION ORAL; TOPICAL at 11:27

## 2019-02-24 NOTE — DISCHARGE INSTRUCTIONS
Take medications as prescribed and follow up with your doctor for recheck as needed   Eat bland foods for the next few days

## 2020-04-17 ENCOUNTER — TELEMEDICINE (OUTPATIENT)
Dept: FAMILY MEDICINE CLINIC | Facility: CLINIC | Age: 45
End: 2020-04-17
Payer: COMMERCIAL

## 2020-04-17 VITALS — BODY MASS INDEX: 42.85 KG/M2 | HEIGHT: 67 IN | TEMPERATURE: 96.5 F | WEIGHT: 273 LBS

## 2020-04-17 DIAGNOSIS — Z11.4 SCREENING FOR HIV (HUMAN IMMUNODEFICIENCY VIRUS): ICD-10-CM

## 2020-04-17 DIAGNOSIS — E55.9 VITAMIN D DEFICIENCY: ICD-10-CM

## 2020-04-17 DIAGNOSIS — F32.A DEPRESSION, UNSPECIFIED DEPRESSION TYPE: ICD-10-CM

## 2020-04-17 DIAGNOSIS — Z72.0 TOBACCO ABUSE: ICD-10-CM

## 2020-04-17 DIAGNOSIS — I10 ESSENTIAL HYPERTENSION: ICD-10-CM

## 2020-04-17 DIAGNOSIS — Z01.419 ENCOUNTER FOR ANNUAL ROUTINE GYNECOLOGICAL EXAMINATION: ICD-10-CM

## 2020-04-17 DIAGNOSIS — Z12.4 CERVICAL CANCER SCREENING: ICD-10-CM

## 2020-04-17 DIAGNOSIS — Z12.31 ENCOUNTER FOR SCREENING MAMMOGRAM FOR BREAST CANCER: ICD-10-CM

## 2020-04-17 DIAGNOSIS — F41.9 ANXIETY: Primary | ICD-10-CM

## 2020-04-17 PROBLEM — D72.829 LEUKOCYTOSIS: Status: ACTIVE | Noted: 2017-08-11

## 2020-04-17 PROBLEM — D75.839 THROMBOCYTOSIS: Status: ACTIVE | Noted: 2017-11-17

## 2020-04-17 PROBLEM — I82.432 ACUTE DEEP VEIN THROMBOSIS (DVT) OF POPLITEAL VEIN OF LEFT LOWER EXTREMITY (HCC): Status: ACTIVE | Noted: 2017-11-17

## 2020-04-17 PROBLEM — R91.8 LUNG MASS: Status: ACTIVE | Noted: 2017-11-17

## 2020-04-17 PROBLEM — I51.7 RIGHT VENTRICULAR DILATION, SECONDARY: Status: ACTIVE | Noted: 2017-11-29

## 2020-04-17 PROBLEM — D35.00 ADRENAL ADENOMA: Status: ACTIVE | Noted: 2017-11-17

## 2020-04-17 PROBLEM — I26.99 ACUTE PULMONARY EMBOLISM (HCC): Status: ACTIVE | Noted: 2017-11-17

## 2020-04-17 PROCEDURE — G2012 BRIEF CHECK IN BY MD/QHP: HCPCS | Performed by: NURSE PRACTITIONER

## 2020-04-17 RX ORDER — NICOTINE 21 MG/24HR
1 PATCH, TRANSDERMAL 24 HOURS TRANSDERMAL EVERY 24 HOURS
Qty: 28 PATCH | Refills: 0 | Status: SHIPPED | OUTPATIENT
Start: 2020-04-17 | End: 2020-08-10

## 2020-04-17 RX ORDER — CITALOPRAM 40 MG/1
40 TABLET ORAL DAILY
Qty: 90 TABLET | Refills: 3 | Status: SHIPPED | OUTPATIENT
Start: 2020-04-17 | End: 2021-04-17

## 2020-04-17 RX ORDER — HYDROXYZINE PAMOATE 50 MG/1
50 CAPSULE ORAL 4 TIMES DAILY PRN
Qty: 90 CAPSULE | Refills: 0 | Status: SHIPPED | OUTPATIENT
Start: 2020-04-17 | End: 2020-08-03 | Stop reason: ALTCHOICE

## 2020-04-17 RX ORDER — MELATONIN
2000 DAILY
Qty: 180 TABLET | Refills: 3 | Status: SHIPPED | OUTPATIENT
Start: 2020-04-17 | End: 2021-04-17

## 2020-05-28 ENCOUNTER — DOCUMENTATION (OUTPATIENT)
Dept: FAMILY MEDICINE CLINIC | Facility: CLINIC | Age: 45
End: 2020-05-28

## 2020-06-23 ENCOUNTER — TELEPHONE (OUTPATIENT)
Dept: FAMILY MEDICINE CLINIC | Facility: CLINIC | Age: 45
End: 2020-06-23

## 2020-07-29 ENCOUNTER — TELEPHONE (OUTPATIENT)
Dept: FAMILY MEDICINE CLINIC | Facility: CLINIC | Age: 45
End: 2020-07-29

## 2020-07-29 NOTE — TELEPHONE ENCOUNTER
Patient left message on voicemail to get a refill on Clonazepam  5 mg takes one daily  Would like sent to [facility] in [place]   Patient had a virtual with Raz Back on 4-  Former[facility]

## 2020-08-03 ENCOUNTER — TELEMEDICINE (OUTPATIENT)
Dept: FAMILY MEDICINE CLINIC | Facility: CLINIC | Age: 45
End: 2020-08-03
Payer: COMMERCIAL

## 2020-08-03 DIAGNOSIS — F41.9 ANXIETY: ICD-10-CM

## 2020-08-03 DIAGNOSIS — G89.29 CHRONIC ELBOW PAIN, RIGHT: ICD-10-CM

## 2020-08-03 DIAGNOSIS — M25.521 CHRONIC ELBOW PAIN, RIGHT: ICD-10-CM

## 2020-08-03 DIAGNOSIS — Z96.621 HISTORY OF RIGHT ELBOW REPLACEMENT: Primary | ICD-10-CM

## 2020-08-03 PROBLEM — D35.00 ADRENAL ADENOMA: Status: RESOLVED | Noted: 2017-11-17 | Resolved: 2020-08-03

## 2020-08-03 PROCEDURE — G2012 BRIEF CHECK IN BY MD/QHP: HCPCS | Performed by: NURSE PRACTITIONER

## 2020-08-03 RX ORDER — CLONAZEPAM 0.5 MG/1
0.5 TABLET ORAL DAILY
Qty: 30 TABLET | Refills: 1 | Status: SHIPPED | OUTPATIENT
Start: 2020-08-03 | End: 2020-09-21 | Stop reason: SDUPTHER

## 2020-08-03 NOTE — PROGRESS NOTES
Virtual Brief Visit    Assessment/Plan:  Take medications as prescribed  Follow with therapist in psych as directed  Please see ortho for right elbow fusion history with chronic pain and now clicking  Problem List Items Addressed This Visit        Other    Anxiety    Relevant Medications    clonazePAM (KlonoPIN) 0 5 mg tablet    History of right elbow replacement - Primary    Relevant Orders    Ambulatory referral to Orthopedic Surgery    Chronic elbow pain, right    Relevant Orders    Ambulatory referral to Orthopedic Surgery                Reason for visit is   Chief Complaint   Patient presents with    Medication Refill     clonazepam    Virtual Brief Visit        Encounter provider PEGGY Stewart    Provider located at [address] [place] [address],[address]  [address] [address] [phone]    Recent Visits  Date Type Provider Dept   07/29/20 Telephone [address]   Showing recent visits within past 7 days and meeting all other requirements     Today's Visits  Date Type Provider Dept   08/03/20 Telemedicine [address] [facility]   Showing today's visits and meeting all other requirements     Future Appointments  No visits were found meeting these conditions  Showing future appointments within next 150 days and meeting all other requirements        After connecting through telephone, the patient was identified by name and date of birth  Phyllis Reyes was informed that this is a telemedicine visit and that the visit is being conducted through telephone  My office door was closed  No one else was in the room  She acknowledged consent and understanding of privacy and security of the platform  The patient has agreed to participate and understands she can discontinue the visit at any time  Patient is aware this is a billable service       Yulia Billingsley is a 54 y o  female medication refill and right elbow pain   This 62-year-old female with telephone with complaints of right elbow pain  This is a chronic condition that required multiple surgeries to which she now states is a fused right elbow  She has had chronic pain for years and now she has a clicking sound  She states she knows the hardware is broken  She denies any injury  She denies numbness tingling or loss of sensation or blue black or cold limb  She has not started her Nicoderm patch as she was afraid of side effects  She has continued to smoke cigarettes daily  She tried the hydroxyzine pamoate for p r n  Anxiety and found it to be ineffective  The only thing that can control her anxiety is 0 5 of clonazepam daily in the morning  She does have an upcoming appointment with psych and therapist who will continue to follow her for psych medication use  She has history of inpatient psych  She denies SI-HI with intent or plan  She does live in a safe gun free environment         Past Medical History:   Diagnosis Date    Anxiety     Depression     Hypertension     Obesity     Pulmonary embolism (HCC)     Tobacco abuse        Past Surgical History:   Procedure Laterality Date    ADRENAL GLAND SURGERY      CYST REMOVAL      right side of neck    HUMERUS FRACTURE SURGERY         Current Outpatient Medications   Medication Sig Dispense Refill    aspirin 81 mg chewable tablet Chew 81 mg daily      cholecalciferol (VITAMIN D3) 1,000 units tablet Take 2 tablets (2,000 Units total) by mouth daily 180 tablet 3    citalopram (CeleXA) 40 mg tablet Take 1 tablet (40 mg total) by mouth daily 90 tablet 3    clonazePAM (KlonoPIN) 0 5 mg tablet Take 1 tablet (0 5 mg total) by mouth daily 30 tablet 1    ergocalciferol (ERGOCALCIFEROL) 97456 units capsule Take 50,000 Units by mouth      losartan (COZAAR) 50 mg tablet       metoprolol succinate (TOPROL-XL) 50 mg 24 hr tablet Take by mouth      nicotine (NICODERM CQ) 21 mg/24 hr TD 24 hr patch Place 1 patch on the skin every 24 hours (Patient not taking: Reported on 8/3/2020) 28 patch 0     No current facility-administered medications for this visit  Allergies   Allergen Reactions    Buspar [Buspirone] Rash and Hives    Other Rash     Surgical tape  Surgical tape       Review of Systems   Constitutional: Negative  HENT: Negative  Eyes: Negative  Respiratory: Negative  Cardiovascular: Negative  Gastrointestinal: Negative  Endocrine: Negative  Genitourinary: Negative  Musculoskeletal: Positive for arthralgias (Right elbow) and myalgias  Neurological: Negative  Psychiatric/Behavioral: Negative for hallucinations, self-injury, sleep disturbance and suicidal ideas  The patient is nervous/anxious  There were no vitals filed for this visit  I spent 20 minutes directly with the patient during this visit    VIRTUAL VISIT DISCLAIMER    Marni Lane acknowledges that she has consented to an online visit or consultation  She understands that the online visit is based solely on information provided by her, and that, in the absence of a face-to-face physical evaluation by the physician, the diagnosis she receives is both limited and provisional in terms of accuracy and completeness  This is not intended to replace a full medical face-to-face evaluation by the physician  Marni Lane understands and accepts these terms

## 2020-08-03 NOTE — PATIENT INSTRUCTIONS
Safe Use of NSAIDs   WHAT YOU NEED TO KNOW:   NSAIDs are medicines that are used to decrease pain, swelling, and fever  NSAIDs are available with or without a doctor's order  NSAIDs that you can buy without a doctor's order include aspirin, ibuprofen, and naproxen  DISCHARGE INSTRUCTIONS:   Return to the emergency department if:   · You have swelling around your mouth or trouble breathing  · You are breathing fast or you have a fast heartbeat  · You have nausea, vomiting, or abdominal pain  · You have blood in your vomit or bowel movements  · You have a seizure  Contact your healthcare provider if:   · You have a headache or become confused  · You develop hearing loss or ringing in your ears  · You develop itching, a rash, or hives  · You have swelling around your lower legs, feet, ankles, and hands  · You do not know how much NSAIDs to give to your child  · You have questions or concerns about your condition or care  How to give NSAIDs to your child safely:   · Read the directions on the label  Find out if the medicine is right for your child's age and how much to give to your child  The dose for your child's weight or age should be listed  Do not  give your child more than the recommended amount  · Use the measuring tool that came with the medicine  Do not  use another measuring tool, such as a kitchen spoon  Other measuring tools do not provide the right amount of medicine  How to take NSAIDs safely:   · Read the directions on the label to learn how much medicine you should take and often to take it  Do not take more than the recommended amount  · Talk to your healthcare provider if you need take NSAIDs for more than 30 days  The longer you take NSAIDs, the higher your risk of side effects will be  You may need to take other medicines to decrease your risk of side effects such as stomach bleeding  · Do not take an over-the-counter NSAIDs with prescription NSAIDs  The combined amount of NSAIDs may be too high  · Tell your healthcare provider about other medicines you take  Some medicines can increase the risk of side effects from NSAIDs  Your healthcare provider will tell you if it is okay to take NSAIDs and how to take them  Who should not take NSAIDs:  Certain people should avoid or limit NSAIDs  Do not  give NSAIDs to children under 10months of age without direction from your child's doctor  Do not give aspirin to children under 25years of age  Your child could develop Reye syndrome if he takes aspirin  Reye syndrome can cause life-threatening brain and liver damage  Check your child's medicine labels for aspirin, salicylates, or oil of wintergreen  Talk to your healthcare provider before you take NSAIDs if any of the following apply to you:  · You have reflux disease, a peptic ulcer, H pylori infection, or bleeding in your stomach or intestines  · You have a bleeding disorder, or you take blood-thinning medicine  · You are allergic to aspirin or other NSAIDs  · You have liver or kidney or disease  · You have high blood pressure or heart disease  · You have 3 or more alcoholic drinks each day  · You are pregnant  What you need to know about an NSAID overdose:  Certain health problems can occur if you take too much NSAID medicine at one time or over time  Problems include nausea, vomiting, and abdominal pain  You may develop gastritis, peptic ulcers, and stomach bleeding  You may also develop fluid retention, heart problems, and kidney problems  NSAIDs can worsen high blood pressure  You may become confused, or you may have a headache, hearing loss, or hallucinations  An overdose of aspirin may also cause rapid breathing, a rapid heartbeat, or seizures  What to do if you think you or your child took too much NSAID medicine:  Call the [facility] at [phone] immediately      © 2017 [employer] Information is for End User's use only and may not be sold, redistributed or otherwise used for commercial purposes  All illustrations and images included in CareNotes® are the copyrighted property of A [employer] M [employer]  or Eulogio Armijo  The above information is an  only  It is not intended as medical advice for individual conditions or treatments  Talk to your doctor, nurse or pharmacist before following any medical regimen to see if it is safe and effective for you  RICE Therapy   WHAT YOU NEED TO KNOW:   RICE therapy is a 4-step process used to reduce swelling and pain from an injury  RICE stands for rest, ice, compression, and elevation  RICE should be done within the first 24 to 48 hours after an injury  DISCHARGE INSTRUCTIONS:   Follow up with your healthcare provider as directed:  Write down your questions so you remember to ask them during your visits  How to use RICE therapy:   · Rest  the injured area so that it can heal  You may need to avoid putting any weight on your injury for at least 48 hours  Return to normal activities as directed  · Ice  the injury for 20 minutes every 4 hours, or as directed  Use an ice pack, or put crushed ice in a plastic bag  Cover it with a towel to protect your skin  Ice helps prevent tissue damage and decreases swelling and pain  · Compress  the injury with an elastic bandage, air cast, special boot, or splint to reduce swelling  Ask your healthcare provider which compression device to use, and how tight it should be  · Elevate  the injured area above the level of your heart as often as you can  This will help decrease swelling and pain  If possible, prop the injured area on pillows or blankets to keep it elevated comfortably  Contact your healthcare provider if:   · Your pain does not decrease, even after treatment  · You have questions or concerns about your condition or care    Return to the emergency department if:   · You have severe pain in the injured area     · You have numbness in the injured area  · You cannot put any weight on or move the injured area  © 2017 [employer]  Information is for End User's use only and may not be sold, redistributed or otherwise used for commercial purposes  All illustrations and images included in CareNotes® are the copyrighted property of [employer]  or Eulogio Armijo  The above information is an  only  It is not intended as medical advice for individual conditions or treatments  Talk to your doctor, nurse or pharmacist before following any medical regimen to see if it is safe and effective for you  Arthralgia   WHAT YOU NEED TO KNOW:   Arthralgia is pain in one or more joints, with no inflammation  It may be short-term and get better within 6 to 8 weeks  Arthralgia can be an early sign of arthritis  Arthralgia may be caused by a medical condition, such as a hormone disorder or a tumor  It may also be caused by an infection or injury  DISCHARGE INSTRUCTIONS:   Medicines: The following medicines may  be ordered for you:  · Acetaminophen  decreases pain  Ask how much to take and how often to take it  Follow directions  Acetaminophen can cause liver damage if not taken correctly  · NSAIDs  decrease pain and prevent swelling  Ask your healthcare provider which medicine is right for you  Ask how much to take and when to take it  Take as directed  NSAIDs can cause stomach bleeding and kidney problems if not taken correctly  · Pain relief cream  decreases pain  Use this cream as directed  · Take your medicine as directed  Contact your healthcare provider if you think your medicine is not helping or if you have side effects  Tell him of her if you are allergic to any medicine  Keep a list of the medicines, vitamins, and herbs you take  Include the amounts, and when and why you take them  Bring the list or the pill bottles to follow-up visits   Carry your medicine list with you in case of an emergency  Follow up with your healthcare provider or specialist as directed:  Write down your questions so you remember to ask them during your visits  Self-care:   · Apply heat  to help decrease pain  Use a heating pad or heat wrap  Apply heat for 20 to 30 minutes every 2 hours for as many days as directed  · Rest  as much as possible  Avoid activities that cause joint pain  · Apply ice  to help decrease swelling and pain  Ice may also help prevent tissue damage  Use an ice pack, or put crushed ice in a plastic bag  Cover it with a towel and place it on your painful joint for 15 to 20 minutes every hour or as directed  · Support  the joint with a brace or elastic wrap as directed  · Elevate  your joint above the level of your heart as often as you can to help decrease swelling and pain  Prop your painful joint on pillows or blankets to keep it elevated comfortably  · Lose weight  if you are overweight  Extra weight can put pressure on your joints and cause more pain  Ask your healthcare provider how much you should weigh  Ask him to help you create a weight loss plan  · Exercise  regularly to help improve joint movement and to decrease pain  Ask about the best exercise plan for you  Low-impact exercises can help take the pressure off your joints  Examples are walking, swimming, and water aerobics  Physical therapy:  A physical therapist teaches you exercises to help improve movement and strength, and to decrease pain  Ask your healthcare provider if physical therapy is right for you  Contact your healthcare provider or specialist if:   · You have a fever  · You continue to have joint pain that cannot be relieved with heat, ice, or medicine  · You have pain and inflammation around your joint  · You have questions or concerns about your condition or care  Return to the emergency department if:   · You have sudden, severe pain when you move your joint       · You have a fever and shaking chills  · You cannot move your joint  · You lose feeling on the side of your body where you have the painful joint  © 2017 [employer] Information is for End User's use only and may not be sold, redistributed or otherwise used for commercial purposes  All illustrations and images included in CareNotes® are the copyrighted property of [employer] M [employer]  or Eulogio Armijo  The above information is an  only  It is not intended as medical advice for individual conditions or treatments  Talk to your doctor, nurse or pharmacist before following any medical regimen to see if it is safe and effective for you  Relaxation and Meditation   WHAT YOU NEED TO KNOW:   What do I need to know about relaxation and meditation? Relaxation and meditation can help decrease pain, stress, and anxiety  Relaxation and meditation also help regulate your breathing and decrease your blood pressure and heartbeat  What are some types of relaxation? · Slow, deep breathing  can help relax your body and mind  Deep breathing can be done at any time  · Progressive muscle relaxation  decreases tense muscles  With this therapy, you relax certain muscle groups until your entire body is relaxed  Start at one end of your body and move to the other end, such as from your feet to your head  · Autogenic training, or self-control relaxation , helps increase the blood flow to your limbs and helps you sleep  With this therapy, you try to replace painful or uncomfortable thoughts and feelings with pleasant ones  · Guided imagery  uses your imagination to help you feel peaceful and calm  You try to see, hear, smell, and taste things that you picture in your mind  For example, you might imagine lying on a beach, feeling the sand, and hearing the waves  · Distraction  uses activities you enjoy to help you take your mind off of pain, stress, or anxiety   Distraction may include, listening to music, painting, reading a book, or exercise  What are some types of meditation? Meditation is a mind exercise that helps to relax your body and free your mind of worry  You sit quietly in a comfortable position, close your eyes, and relax your muscles  Your thoughts are relaxed while your body stays alert  Meditation can be done alone or with other people  · Mantra meditation  is when you think or speak a certain word or phrase over and over  The word or phrase often has a smooth sound  The mantra is used as a way to help you focus  The sound is believed to make vibrations that have different effects on people  · Mindfulness meditation  is when you focus on what is happening in your life at that point in time  You become aware of your thoughts and feelings in the present without making any judgment  You learn not to worry about your past and future  CARE AGREEMENT:   You have the right to help plan your care  Learn about your health condition and how it may be treated  Discuss treatment options with your caregivers to decide what care you want to receive  You always have the right to refuse treatment  The above information is an  only  It is not intended as medical advice for individual conditions or treatments  Talk to your doctor, nurse or pharmacist before following any medical regimen to see if it is safe and effective for you  © 2017 [employer] Information is for End User's use only and may not be sold, redistributed or otherwise used for commercial purposes  All illustrations and images included in CareNotes® are the copyrighted property of [employer]  or [employer]  Clonazepam (By mouth)   Clonazepam ([id number])  Treats seizures and panic disorder  Brand Name(s): KlonoPIN   There may be other brand names for this medicine  When This Medicine Should Not Be Used: This medicine is not right for everyone   Do not use it if you had an allergic reaction to clonazepam or similar medicines, or if you are pregnant or breastfeeding  How to Use This Medicine:   Tablet, Dissolving Tablet  · Take your medicine as directed  Your dose may need to be changed several times to find what works best for you  · Disintegrating tablet: Dry your hands before you handle the tablet  Place the tablet on your tongue and let it dissolve  · Tablet: Swallow whole with water  · This medicine should come with a Medication Guide  Ask your pharmacist for a copy if you do not have one  · Missed dose: Take a dose as soon as you remember  If it is almost time for your next dose, wait until then and take a regular dose  Do not take extra medicine to make up for a missed dose  · Store the medicine in a closed container at room temperature, away from heat, moisture, and direct light  Drugs and Foods to Avoid:   Ask your doctor or pharmacist before using any other medicine, including over-the-counter medicines, vitamins, and herbal products  · Some medicines can affect how clonazepam works  Tell your doctor if you are using any of the following:   ¨ Carbamazepine, phenobarbital, phenytoin  ¨ Medicine to treat depression or mental health problems, including MAO inhibitors  ¨ Medicine to treat fungal infections  ¨ Phenothiazine medicine  · Do not drink alcohol while you are using this medicine  · Tell your doctor if you use anything else that makes you sleepy  Some examples are allergy medicine, narcotic pain medicine, and alcohol  Warnings While Using This Medicine:   · It is not safe to take this medicine during pregnancy  It could harm an unborn baby  Tell your doctor right away if you become pregnant  · Tell your doctor if you have kidney disease, liver disease, glaucoma, lung disease or breathing problems, porphyria, or a history of drug or alcohol abuse, depression, or mental health problems  · This medicine can increase thoughts of suicide   Tell your doctor right away if you start to feel depressed and have thoughts about hurting yourself  · This medicine can be habit-forming  Do not use more than your prescribed dose  Call your doctor if you think your medicine is not working  · Do not stop using this medicine suddenly  Your doctor will need to slowly decrease your dose before you stop it completely  · This medicine may make you dizzy or drowsy  Do not drive or do anything else that could be dangerous until you know how this medicine affects you  · Your doctor will do lab tests at regular visits to check on the effects of this medicine  Keep all appointments  · Keep all medicine out of the reach of children  Never share your medicine with anyone  Possible Side Effects While Using This Medicine:   Call your doctor right away if you notice any of these side effects:  · Allergic reaction: Itching or hives, swelling in your face or hands, swelling or tingling in your mouth or throat, chest tightness, trouble breathing  · Confusion, memory problems  · Depression, unusual moods or behavior, thoughts of hurting yourself  · Extreme drowsiness, tiredness, or weakness, slow heartbeat, problems with coordination or walking  · Worsening seizures  If you notice these less serious side effects, talk with your doctor:   · Cough, runny or stuffy nose, sore throat  · Increased saliva  If you notice other side effects that you think are caused by this medicine, tell your doctor  Call your doctor for medical advice about side effects  You may report side effects to FDA at 3-334-FDA-2549  © 2017 [employer] Information is for End User's use only and may not be sold, redistributed or otherwise used for commercial purposes  The above information is an  only  It is not intended as medical advice for individual conditions or treatments   Talk to your doctor, nurse or pharmacist before following any medical regimen to see if it is safe and effective for you   Depression   AMBULATORY CARE:   Depression  is a medical condition that causes feelings of sadness or hopelessness that do not go away  Depression may cause you to lose interest in things you used to enjoy  These feelings may interfere with your daily life  Common symptoms include the following:   · Appetite changes, or weight gain or loss    · Trouble going to sleep or staying asleep, or sleeping too much    · Fatigue or lack of energy    · Feeling restless, irritable, or withdrawn    · Feeling worthless, hopeless, discouraged, or guilty    · Trouble concentrating, remembering things, doing daily tasks, or making decisions    · Thoughts about hurting or killing yourself  Call 911 for any of the following:   · You think about harming yourself or someone else  Contact your healthcare provider if:   · Your symptoms do not improve  · You cannot make it to your next appointment  · You have new symptoms  · You have questions or concerns about your condition or care  Treatment for depression  may include medicine to improve or balance your mood  Therapy may also be used to treat your depression  A therapist will help you learn to cope with your thoughts and feelings  Therapy can be done alone or in a group  It may also be done with family members or a significant other  Self-care:   · Get regular physical activity  Try to exercise for 30 minutes, 3 to 5 days a week  Work with your healthcare provider to develop an exercise plan that you enjoy  Physical activity may improve your symptoms  · Get enough sleep  Create a routine to help you relax before bed  You can listen to music, read, or do yoga  Try to go to bed and wake up at the same time every day  Sleep is important for emotional health  · Eat a variety of healthy foods from all of the food groups  A healthy meal plan is low in fat, salt, and added sugar   Ask your healthcare provider for more information about a meal plan that is right for you  · Do not drink alcohol or use drugs  Alcohol and drugs can make your symptoms worse  Follow up with your healthcare provider as directed: Your healthcare provider will monitor your progress at follow-up visits  He or she will also monitor your medicine if you take antidepressants  Your healthcare provider will ask if the medicine is helping  Tell him or her about any side effects or problems you may have with your medicine  The type or amount of medicine may need to be changed  Write down your questions so you remember to ask them during your visits  © 2017 [facility] [facility] Information is for End User's use only and may not be sold, redistributed or otherwise used for commercial purposes  All illustrations and images included in CareNotes® are the copyrighted property of [employer] M [employer]  or Eulogio Armijo  The above information is an  only  It is not intended as medical advice for individual conditions or treatments  Talk to your doctor, nurse or pharmacist before following any medical regimen to see if it is safe and effective for you  Anxiety   WHAT YOU NEED TO KNOW:   What do I need to know about anxiety? Anxiety is a condition that causes you to feel extremely worried or nervous  The feelings are so strong that they can cause problems with your daily activities or sleep  Anxiety may be triggered by something you fear, or it may happen without a cause  Family or work stress, smoking, caffeine, and alcohol can increase your risk for anxiety  Certain medicines or health conditions can also increase your risk  Anxiety can become a long-term condition if it is not managed or treated  What other common signs and symptoms may occur with anxiety?    · Fatigue or muscle tightness     · Shaking, restlessness, or irritability     · Problems focusing     · Trouble sleeping     · Feeling jumpy, easily startled, or dizzy     · Rapid heartbeat or shortness of breath  What do I need to tell my healthcare provider about my anxiety? Tell your healthcare provider when your symptoms began and what triggers them  Tell your provider if anxiety affects your daily activities  Your provider will also ask about your medical history and if you have family members with a similar condition  Tell your provider about your past and present alcohol, nicotine, or drug use  What can I do to manage anxiety? You may get medicines to help you feel calm and relaxed, and to decrease your symptoms  Medicines are usually given together with therapy or other treatments  The following can help you manage anxiety:  · Talk to someone about your anxiety  Your healthcare provider may suggest counseling  Cognitive behavioral therapy can help you understand and change how you react to events that trigger your symptoms  You might feel more comfortable talking with a friend or family member about your anxiety  Choose someone you know will be supportive and encouraging  · Find ways to relax  Activities such as exercise, meditation, or listening to music can help you relax  Spend time with friends, or do things you enjoy  · Practice deep breathing  Deep breathing can help you relax when you feel anxious  Focus on taking slow, deep breaths several times a day, or during an anxiety attack  Breathe in through your nose and out through your mouth  · Create a regular sleep routine  Regular sleep can help you feel calmer during the day  Go to sleep and wake up at the same times every day  Do not watch television or use the computer right before bed  Your room should be comfortable, dark, and quiet  · Eat a variety of healthy foods  Healthy foods include fruits, vegetables, low-fat dairy products, lean meats, fish, whole-grain breads, and cooked beans  Healthy foods can help you feel less anxious and have more energy  · Exercise regularly  Exercise can increase your energy level   Exercise may also lift your mood and help you sleep better  Your healthcare provider can help you create an exercise plan  · Do not smoke  Nicotine and other chemicals in cigarettes and cigars can increase anxiety  Ask your healthcare provider for information if you currently smoke and need help to quit  E-cigarettes or smokeless tobacco still contain nicotine  Talk to your healthcare provider before you use these products  · Do not have caffeine  Caffeine can make your symptoms worse  Do not have foods or drinks that are meant to increase your energy level  · Limit or do not drink alcohol  Ask your healthcare provider if alcohol is safe for you  You may not be able to drink alcohol if you take certain anxiety or depression medicines  Limit alcohol to 1 drink per day if you are a woman  Limit alcohol to 2 drinks per day if you are a man  A drink of alcohol is 12 ounces of beer, 5 ounces of wine, or 1½ ounces of liquor  · Do not use drugs  Drugs can make your anxiety worse  It can also make anxiety hard to manage  Talk to your healthcare provider if you use drugs and want help to quit  Call 911 if:   · You have chest pain, tightness, or heaviness that may spread to your shoulders, arms, jaw, neck, or back  · You feel like hurting yourself or someone else  When should I contact my healthcare provider? · Your symptoms get worse or do not get better with treatment  · Your anxiety keeps you from doing your regular daily activities  · You have new symptoms since your last visit  · You have questions or concerns about your condition or care  CARE AGREEMENT:   You have the right to help plan your care  Learn about your health condition and how it may be treated  Discuss treatment options with your caregivers to decide what care you want to receive  You always have the right to refuse treatment  The above information is an  only   It is not intended as medical advice for individual conditions or treatments  Talk to your doctor, nurse or pharmacist before following any medical regimen to see if it is safe and effective for you  © 2017 [employer] Information is for End User's use only and may not be sold, redistributed or otherwise used for commercial purposes  All illustrations and images included in CareNotes® are the copyrighted property of A D A M , Inc  or Eulogio Armijo

## 2020-08-10 ENCOUNTER — APPOINTMENT (OUTPATIENT)
Dept: RADIOLOGY | Facility: MEDICAL CENTER | Age: 45
End: 2020-08-10
Payer: COMMERCIAL

## 2020-08-10 ENCOUNTER — CONSULT (OUTPATIENT)
Dept: OBGYN CLINIC | Facility: MEDICAL CENTER | Age: 45
End: 2020-08-10
Payer: COMMERCIAL

## 2020-08-10 VITALS
SYSTOLIC BLOOD PRESSURE: 124 MMHG | HEIGHT: 67 IN | HEART RATE: 81 BPM | WEIGHT: 264 LBS | DIASTOLIC BLOOD PRESSURE: 87 MMHG | TEMPERATURE: 97.9 F | BODY MASS INDEX: 41.44 KG/M2

## 2020-08-10 DIAGNOSIS — M25.521 ELBOW PAIN, RIGHT: ICD-10-CM

## 2020-08-10 DIAGNOSIS — M25.521 CHRONIC ELBOW PAIN, RIGHT: ICD-10-CM

## 2020-08-10 DIAGNOSIS — G89.29 CHRONIC ELBOW PAIN, RIGHT: ICD-10-CM

## 2020-08-10 DIAGNOSIS — M25.521 ELBOW PAIN, RIGHT: Primary | ICD-10-CM

## 2020-08-10 PROCEDURE — 99213 OFFICE O/P EST LOW 20 MIN: CPT | Performed by: ORTHOPAEDIC SURGERY

## 2020-08-10 PROCEDURE — 3079F DIAST BP 80-89 MM HG: CPT | Performed by: ORTHOPAEDIC SURGERY

## 2020-08-10 PROCEDURE — 3074F SYST BP LT 130 MM HG: CPT | Performed by: ORTHOPAEDIC SURGERY

## 2020-08-10 PROCEDURE — 3008F BODY MASS INDEX DOCD: CPT | Performed by: ORTHOPAEDIC SURGERY

## 2020-08-10 PROCEDURE — 73060 X-RAY EXAM OF HUMERUS: CPT

## 2020-08-10 PROCEDURE — 4004F PT TOBACCO SCREEN RCVD TLK: CPT | Performed by: ORTHOPAEDIC SURGERY

## 2020-08-10 NOTE — PROGRESS NOTES
CHIEF COMPLAINT:  Chief Complaint   Patient presents with    Right Arm - Pain       SUBJECTIVE:  Aparna Nunez is a 54y o  year old  female who presents to the office for evaluation of her right elbow  Pt has a hx of right humeral fracture with a complex surgical history  Pt states that she has had 8 surgeries  Patient was originally seen at [facility] for a humerus fracture that was being treated with a [place] brace but after 4 weeks patient was not happy with the healing and was seen by Dr Estefania Silva who performed 8 surgeries the first of which was complicated by an iatrogenic fracture after an IM shelby attempt per patients reporting  Her latest procedure was an elbow arthrodesis  Patient now presents with a new injury  She states that her dog knocked her over about 2 weeks ago and since then she feels like there is motion in her right elbow that was previously fused  Patient states that there was no motion at the elbow prior to the fall  Patient does not voluntarily report pain  Her only complaint is motion now present        PAST MEDICAL HISTORY:  Past Medical History:   Diagnosis Date    Anxiety     Depression     Hypertension     Obesity     Pulmonary embolism (HCC)     Tobacco abuse        PAST SURGICAL HISTORY:  Past Surgical History:   Procedure Laterality Date    ADRENAL GLAND SURGERY      CYST REMOVAL      right side of neck    HUMERUS FRACTURE SURGERY         FAMILY HISTORY:  Family History   Problem Relation Age of Onset    Heart failure Mother     Diabetes Mother     Cancer Father     Autism Son        SOCIAL HISTORY:  Social History     Tobacco Use    Smoking status: Current Every Day Smoker     Packs/day: 0 25     Years: 30 00     Pack years: 7 50     Types: Cigarettes    Smokeless tobacco: Never Used   Substance Use Topics    Alcohol use: No     Alcohol/week: 0 0 standard drinks    Drug use: No       MEDICATIONS:    Current Outpatient Medications:     aspirin 81 mg chewable tablet, Chew 81 mg daily, Disp: , Rfl:     cholecalciferol (VITAMIN D3) 1,000 units tablet, Take 2 tablets (2,000 Units total) by mouth daily, Disp: 180 tablet, Rfl: 3    citalopram (CeleXA) 40 mg tablet, Take 1 tablet (40 mg total) by mouth daily, Disp: 90 tablet, Rfl: 3    clonazePAM (KlonoPIN) 0 5 mg tablet, Take 1 tablet (0 5 mg total) by mouth daily, Disp: 30 tablet, Rfl: 1    ergocalciferol (ERGOCALCIFEROL) 43016 units capsule, Take 50,000 Units by mouth, Disp: , Rfl:     losartan (COZAAR) 50 mg tablet, , Disp: , Rfl:     metoprolol succinate (TOPROL-XL) 50 mg 24 hr tablet, Take by mouth, Disp: , Rfl:     ALLERGIES:  Allergies   Allergen Reactions    Buspar [Buspirone] Rash and Hives    Other Rash     Surgical tape  Surgical tape       REVIEW OF SYSTEMS:  Review of Systems   Constitutional: Negative for chills, fever and unexpected weight change  HENT: Negative for hearing loss, nosebleeds and sore throat  Eyes: Negative for pain, redness and visual disturbance  Respiratory: Negative for cough, shortness of breath and wheezing  Cardiovascular: Negative for chest pain, palpitations and leg swelling  Gastrointestinal: Negative for abdominal pain, nausea and vomiting  Endocrine: Negative for polydipsia and polyuria  Genitourinary: Negative for dysuria and hematuria  Skin: Negative for rash and wound  Neurological: Negative for dizziness and headaches  Psychiatric/Behavioral: Negative for decreased concentration, dysphoric mood and suicidal ideas  The patient is not nervous/anxious          VITALS:  Vitals:    08/10/20 0900   BP: 124/87   Pulse: 81   Temp: 97 9 °F (36 6 °C)       LABS:  HgA1c: No results found for: HGBA1C  BMP:   Lab Results   Component Value Date    GLUCOSE 99 06/12/2014    CALCIUM 9 4 02/24/2019     06/12/2014    K 3 7 02/24/2019    CO2 28 02/24/2019    CL 94 (L) 02/24/2019    BUN 12 02/24/2019    CREATININE 0 90 02/24/2019 _____________________________________________________  PHYSICAL EXAMINATION:  General: well developed and well nourished, alert, oriented times 3 and appears comfortable  Psychiatric: Normal  HEENT: Trachea Midline, No torticollis  Pulmonary: No audible wheezing or strider  Cardiovascular: No discernable arrhythmia   Skin: No masses, erythema, lacerations, fluctation, ulcerations  Neurovascular: Sensation Intact to the Median, Ulnar, Radial Nerve, Motor Intact to the Median, Ulnar, Radial Nerve and Pulses Intact    MUSCULOSKELETAL EXAMINATION:  Right elbow   Gross motion at elbow   Rash at medial aspect of elbow in skin fold     ___________________________________________________  STUDIES REVIEWED:  X-rays of the right elbow performed today show chronic nonunion of distal humerus, several plates and screws, hardware in position of attempted arthrodesis with questionable fracture of plate at arthrodesis site, nonunion persists, no evidence of new fracture      PROCEDURES PERFORMED:  Procedures  No Procedures performed today    _____________________________________________________  ASSESSMENT/PLAN:    Right elbow - questionable hardware failure at arthrodesis site  * Since patient is asymptomatic, recommendation is for observation only  * pt was advised to see original surgeon for his opinion  * pt was advised that any further surgery will not likely benefit her  * Pt was advised that her current state is likely the best expected outcome      Rash at elbow  * 4x4 was placed in skin fold to collect moisture   * Pt was advised to continue to do so         Follow Up:  Return if symptoms worsen or fail to improve        To Do Next Visit:  Re-evaluation of current issue      Scribe Attestation    I,:   Jay Perry am acting as a scribe while in the presence of the attending physician :        I,:   Damion San MD personally performed the services described in this documentation    as scribed in my presence :

## 2020-09-16 LAB — EXTERNAL HIV SCREEN: NORMAL

## 2020-09-18 ENCOUNTER — TELEPHONE (OUTPATIENT)
Dept: FAMILY MEDICINE CLINIC | Facility: CLINIC | Age: 45
End: 2020-09-18

## 2020-09-18 NOTE — TELEPHONE ENCOUNTER

## 2020-09-21 ENCOUNTER — OFFICE VISIT (OUTPATIENT)
Dept: FAMILY MEDICINE CLINIC | Facility: CLINIC | Age: 45
End: 2020-09-21
Payer: COMMERCIAL

## 2020-09-21 VITALS
HEART RATE: 86 BPM | HEIGHT: 67 IN | BODY MASS INDEX: 41.44 KG/M2 | TEMPERATURE: 98.3 F | SYSTOLIC BLOOD PRESSURE: 132 MMHG | RESPIRATION RATE: 16 BRPM | DIASTOLIC BLOOD PRESSURE: 88 MMHG | WEIGHT: 264 LBS | OXYGEN SATURATION: 98 %

## 2020-09-21 DIAGNOSIS — Z01.419 ENCOUNTER FOR ROUTINE GYNECOLOGICAL EXAMINATION WITH PAPANICOLAOU SMEAR OF CERVIX: ICD-10-CM

## 2020-09-21 DIAGNOSIS — Z23 NEED FOR VACCINATION: ICD-10-CM

## 2020-09-21 DIAGNOSIS — Z00.00 ANNUAL PHYSICAL EXAM: Primary | ICD-10-CM

## 2020-09-21 DIAGNOSIS — Z01.20 ENCOUNTER FOR ROUTINE DENTAL EXAMINATION: ICD-10-CM

## 2020-09-21 DIAGNOSIS — R73.03 PREDIABETES: ICD-10-CM

## 2020-09-21 DIAGNOSIS — Z13.220 SCREENING CHOLESTEROL LEVEL: ICD-10-CM

## 2020-09-21 DIAGNOSIS — R73.01 ELEVATED FASTING GLUCOSE: ICD-10-CM

## 2020-09-21 DIAGNOSIS — R06.83 SNORES: ICD-10-CM

## 2020-09-21 DIAGNOSIS — F41.9 ANXIETY: ICD-10-CM

## 2020-09-21 DIAGNOSIS — Z12.4 SCREENING FOR CERVICAL CANCER: ICD-10-CM

## 2020-09-21 DIAGNOSIS — I10 ESSENTIAL HYPERTENSION: ICD-10-CM

## 2020-09-21 DIAGNOSIS — G47.8 UNREFRESHED BY SLEEP: ICD-10-CM

## 2020-09-21 DIAGNOSIS — E55.9 VITAMIN D DEFICIENCY: ICD-10-CM

## 2020-09-21 LAB — SL AMB POCT HEMOGLOBIN AIC: 6.2 (ref ?–6.5)

## 2020-09-21 PROCEDURE — 3075F SYST BP GE 130 - 139MM HG: CPT | Performed by: FAMILY MEDICINE

## 2020-09-21 PROCEDURE — 4004F PT TOBACCO SCREEN RCVD TLK: CPT | Performed by: FAMILY MEDICINE

## 2020-09-21 PROCEDURE — 90686 IIV4 VACC NO PRSV 0.5 ML IM: CPT | Performed by: FAMILY MEDICINE

## 2020-09-21 PROCEDURE — 3725F SCREEN DEPRESSION PERFORMED: CPT | Performed by: FAMILY MEDICINE

## 2020-09-21 PROCEDURE — 3079F DIAST BP 80-89 MM HG: CPT | Performed by: FAMILY MEDICINE

## 2020-09-21 PROCEDURE — 83036 HEMOGLOBIN GLYCOSYLATED A1C: CPT | Performed by: FAMILY MEDICINE

## 2020-09-21 PROCEDURE — 36416 COLLJ CAPILLARY BLOOD SPEC: CPT | Performed by: FAMILY MEDICINE

## 2020-09-21 PROCEDURE — 90471 IMMUNIZATION ADMIN: CPT | Performed by: FAMILY MEDICINE

## 2020-09-21 PROCEDURE — 99396 PREV VISIT EST AGE 40-64: CPT | Performed by: FAMILY MEDICINE

## 2020-09-21 RX ORDER — CHOLECALCIFEROL (VITAMIN D3) 125 MCG
1 TABLET ORAL DAILY
Qty: 90 TABLET | Refills: 3 | Status: SHIPPED | OUTPATIENT
Start: 2020-09-21 | End: 2020-12-10

## 2020-09-21 RX ORDER — METOPROLOL SUCCINATE 50 MG/1
50 TABLET, EXTENDED RELEASE ORAL DAILY
Qty: 30 TABLET | Refills: 11 | Status: SHIPPED | OUTPATIENT
Start: 2020-09-21

## 2020-09-21 RX ORDER — CLONAZEPAM 0.5 MG/1
0.5 TABLET ORAL DAILY
Qty: 30 TABLET | Refills: 1 | Status: SHIPPED | OUTPATIENT
Start: 2020-09-21

## 2020-09-21 NOTE — PROGRESS NOTES
[facility] [address] [place]    NAME: Gideon Lane  AGE: 54 y o  SEX: female  : 1975     DATE: 2020     Assessment and Plan: See referrals as ordered  See cardiology as previosly scheduled  Problem List Items Addressed This Visit        Cardiovascular and Mediastinum    Essential hypertension       Other    BMI 40 0-44 9, adult ([address] )    Relevant Orders    Ambulatory referral to Pulmonology    Vitamin D deficiency    Relevant Medications    Ergocalciferol (Vitamin D2) 50 MCG (2000) TABS    Elevated fasting glucose    Relevant Orders    POCT hemoglobin A1c (Completed)    Need for vaccination    Relevant Orders    influenza vaccine, quadrivalent, 0 5 mL, preservative-free, for adult and pediatric patients 6 mos+ (AFLURIA, FLUARIX, FLULAVAL, FLUZONE) (Completed)    Annual physical exam - Primary      Other Visit Diagnoses     Encounter for routine gynecological examination with Papanicolaou smear of cervix        Relevant Orders    Ambulatory referral to Obstetrics / Gynecology    Screening for cervical cancer        Screening cholesterol level        Snores        Relevant Orders    Ambulatory referral to Pulmonology    Unrefreshed by sleep        Relevant Orders    Ambulatory referral to Pulmonology    Encounter for routine dental examination        Relevant Orders    Ambulatory referral to Dentistry          Immunizations and preventive care screenings were discussed with patient today  Appropriate education was printed on patient's after visit summary  Counseling:  Alcohol/drug use: discussed moderation in alcohol intake, the recommendations for healthy alcohol use, and avoidance of illicit drug use  Dental Health: discussed importance of regular tooth brushing, flossing, and dental visits    Injury prevention: discussed safety/seat belts, safety helmets, smoke detectors, carbon dioxide detectors, and smoking near bedding or upholstery  Sexual health: discussed sexually transmitted diseases, partner selection, use of condoms, avoidance of unintended pregnancy, and contraceptive alternatives  · Exercise: the importance of regular exercise/physical activity was discussed  Recommend exercise 3-5 times per week for at least 30 minutes  Return in 1 year (on 9/21/2021)  Chief Complaint:     Chief Complaint   Patient presents with    Other     review labs      History of Present Illness:     Adult Annual Physical   Patient here for a comprehensive physical exam  The patient reports problems - R pain  Diet and Physical Activity  · Diet/Nutrition: poor diet  · Exercise: no formal exercise  Depression Screening  PHQ-9 Depression Screening    PHQ-9:    Frequency of the following problems over the past two weeks:       Little interest or pleasure in doing things:  0 - not at all  Feeling down, depressed, or hopeless:  0 - not at all  PHQ-2 Score:  0       General Health  · Sleep: sleeps poorly, gets 4-6 hours of sleep on average, snores loudly and unrefreshing sleep  · Hearing: normal - bilateral   · Vision: no vision problems, goes for regular eye exams, most recent eye exam <1 year ago, wears glasses and for reading  · Dental: no dental visits for >1 year, brushes teeth three times daily and does not floss  /GYN Health  · Patient is: premenopausal  · Last menstrual period: 9/19/2020   · Contraceptive method: none  Review of Systems:     Review of Systems   Constitutional: Negative  HENT: Negative  Eyes: Negative  Respiratory: Negative  Cardiovascular: Negative  Gastrointestinal: Negative  Endocrine: Negative  Genitourinary: Negative  Musculoskeletal: Negative  Skin: Negative  Allergic/Immunologic: Negative  Neurological: Negative  Hematological: Negative  Psychiatric/Behavioral: Negative         Past Medical History:     Past Medical History:   Diagnosis Date    Anxiety     Depression     Hypertension     Obesity     Pulmonary embolism (HCC)     Right arm fracture     Tobacco abuse       Past Surgical History:     Past Surgical History:   Procedure Laterality Date    ADRENAL GLAND SURGERY      CYST REMOVAL      right side of neck    HUMERUS FRACTURE SURGERY        Social History:     E-Cigarette/Vaping    E-Cigarette Use Never User      E-Cigarette/Vaping Substances    Nicotine No     THC No     CBD No     Flavoring No     Other No     Unknown No      Social History     Socioeconomic History    Marital status: Single     Spouse name: None    Number of children: 11    Years of education: None    Highest education level: None   Occupational History    Occupation: unemployed   Social Needs    Financial resource strain: Somewhat hard    Food insecurity     Worry: Often true     Inability: Often true    Transportation needs     Medical: No     Non-medical: No   Tobacco Use    Smoking status: Current Every Day Smoker     Packs/day: 0 25     Years: 30 00     Pack years: 7 50     Types: Cigarettes    Smokeless tobacco: Never Used   Substance and Sexual Activity    Alcohol use: No     Alcohol/week: 0 0 standard drinks    Drug use: No    Sexual activity: Not Currently     Partners: Male   Lifestyle    Physical activity     Days per week: 4 days     Minutes per session: 30 min    Stress:  To some extent   Relationships    Social connections     Talks on phone: More than three times a week     Gets together: More than three times a week     Attends Muslim service: Never     Active member of club or organization: Yes     Attends meetings of clubs or organizations: Never     Relationship status: Living with partner    Intimate partner violence     Fear of current or ex partner: No     Emotionally abused: No     Physically abused: No     Forced sexual activity: No   Other Topics Concern    None   Social History Narrative    · Do you currently or have you served in [employer]:   No      · Were you activated, into active duty, as a member of the [employer] or as a Reservist:   No       Family History:     Family History   Problem Relation Age of Onset    Heart failure Mother     Diabetes Mother     Cancer Father     Autism Son       Current Medications:     Current Outpatient Medications   Medication Sig Dispense Refill    aspirin 81 mg chewable tablet Chew 81 mg daily      cholecalciferol (VITAMIN D3) 1,000 units tablet Take 2 tablets (2,000 Units total) by mouth daily 180 tablet 3    citalopram (CeleXA) 40 mg tablet Take 1 tablet (40 mg total) by mouth daily 90 tablet 3    clonazePAM (KlonoPIN) 0 5 mg tablet Take 1 tablet (0 5 mg total) by mouth daily 30 tablet 1    Ergocalciferol (Vitamin D2) 50 MCG (2000 UT) TABS Take 1 tablet (2,000 Units total) by mouth daily 90 tablet 3    losartan (COZAAR) 50 mg tablet       metoprolol succinate (TOPROL-XL) 50 mg 24 hr tablet Take by mouth       No current facility-administered medications for this visit  Allergies: Allergies   Allergen Reactions    Buspar [Buspirone] Rash and Hives    Other Rash     Surgical tape  Surgical tape      Physical Exam:     /88 (BP Location: Left arm, Patient Position: Sitting, Cuff Size: Large)   Pulse 86   Temp 98 3 °F (36 8 °C) (Temporal)   Resp 16   Ht 5' 7" (1 702 m)   Wt 120 kg (264 lb)   SpO2 98%   BMI 41 35 kg/m²     Physical Exam  Vitals signs and nursing note reviewed  Constitutional:       Appearance: Normal appearance  She is obese  HENT:      Head: Normocephalic and atraumatic  Nose: Nose normal       Mouth/Throat:      Mouth: Mucous membranes are moist    Eyes:      Pupils: Pupils are equal, round, and reactive to light  Neck:      Musculoskeletal: Normal range of motion  Cardiovascular:      Rate and Rhythm: Normal rate and regular rhythm  Heart sounds: Normal heart sounds  No murmur  No friction rub   No gallop  Pulmonary:      Effort: Pulmonary effort is normal       Breath sounds: Normal breath sounds  Abdominal:      General: Bowel sounds are normal  There is no distension  Palpations: Abdomen is soft  Musculoskeletal: Normal range of motion  Skin:     General: Skin is warm and dry  Neurological:      General: No focal deficit present  Mental Status: She is alert and oriented to person, place, and time  Mental status is at baseline  Motor: No weakness  Psychiatric:         Mood and Affect: Mood normal          Behavior: Behavior normal          Thought Content:  Thought content normal          Judgment: Judgment normal           Felicita Swift 157

## 2020-09-21 NOTE — PATIENT INSTRUCTIONS
Wellness Visit for Adults   AMBULATORY CARE:   A wellness visit  is when you see your healthcare provider to get screened for health problems  You can also get advice on how to stay healthy  Write down your questions so you remember to ask them  Ask your healthcare provider how often you should have a wellness visit  What happens at a wellness visit:  Your healthcare provider will ask about your health, and your family history of health problems  This includes high blood pressure, heart disease, and cancer  He or she will ask if you have symptoms that concern you, if you smoke, and about your mood  You may also be asked about your intake of medicines, supplements, food, and alcohol  Any of the following may be done:  · Your weight  will be checked  Your height may also be checked so your body mass index (BMI) can be calculated  Your BMI shows if you are at a healthy weight  · Your blood pressure  and heart rate will be checked  Your temperature may also be checked  · Blood and urine tests  may be done  Blood tests may be done to check your cholesterol levels  Abnormal cholesterol levels increase your risk for heart disease and stroke  You may also need a blood or urine test to check for diabetes if you are at increased risk  Urine tests may be done to look for signs of an infection or kidney disease  · A physical exam  includes checking your heartbeat and lungs with a stethoscope  Your healthcare provider may also check your skin to look for sun damage  · Screening tests  may be recommended  A screening test is done to check for diseases that may not cause symptoms  The screening tests you may need depend on your age, gender, family history, and lifestyle habits  For example, colorectal screening may be recommended if you are 48years old or older  Screening tests you need if you are a woman:   · A Pap smear  is used to screen for cervical cancer   Pap smears are usually done every 3 to 5 years depending on your age  You may need them more often if you have had abnormal Pap smear test results in the past  Ask your healthcare provider how often you should have a Pap smear  · A mammogram  is an x-ray of your breasts to screen for breast cancer  Experts recommend mammograms every 2 years starting at age 48 years  You may need a mammogram at age 52 years or younger if you have an increased risk for breast cancer  Talk to your healthcare provider about when you should start having mammograms and how often you need them  Vaccines you may need:   · Get an influenza vaccine  every year  The influenza vaccine protects you from the flu  Several types of viruses cause the flu  The viruses change over time, so new vaccines are made each year  · Get a tetanus-diphtheria (Td) booster vaccine  every 10 years  This vaccine protects you against tetanus and diphtheria  Tetanus is a severe infection that may cause painful muscle spasms and lockjaw  Diphtheria is a severe bacterial infection that causes a thick covering in the back of your mouth and throat  · Get a human papillomavirus (HPV) vaccine  if you are female and aged 23 to 32 or male 23 to 24 and never received it  This vaccine protects you from HPV infection  HPV is the most common infection spread by sexual contact  HPV may also cause vaginal, penile, and anal cancers  · Get a pneumococcal vaccine  if you are aged 72 years or older  The pneumococcal vaccine is an injection given to protect you from pneumococcal disease  Pneumococcal disease is an infection caused by pneumococcal bacteria  The infection may cause pneumonia, meningitis, or an ear infection  · Get a shingles vaccine  if you are aged 61 or older, even if you have had shingles before  The shingles vaccine is an injection to protect you from the varicella-zoster virus  This is the same virus that causes chickenpox   Shingles is a painful rash that develops in people who had chickenpox or have been exposed to the virus  How to eat healthy:  My Plate is a model for planning healthy meals  It shows the types and amounts of foods that should go on your plate  Fruits and vegetables make up about half of your plate, and grains and protein make up the other half  A serving of dairy is included on the side of your plate  The amount of calories and serving sizes you need depends on your age, gender, weight, and height  Examples of healthy foods are listed below:  · Eat a variety of vegetables  such as dark green, red, and orange vegetables  You can also include canned vegetables low in sodium (salt) and frozen vegetables without added butter or sauces  · Eat a variety of fresh fruits , canned fruit in 100% juice, frozen fruit, and dried fruit  · Include whole grains  At least half of the grains you eat should be whole grains  Examples include whole-wheat bread, wheat pasta, brown rice, and whole-grain cereals such as oatmeal     · Eat a variety of protein foods such as seafood (fish and shellfish), lean meat, and poultry without skin (turkey and chicken)  Examples of lean meats include pork leg, shoulder, or tenderloin, and beef round, sirloin, tenderloin, and extra lean ground beef  Other protein foods include eggs and egg substitutes, beans, peas, soy products, nuts, and seeds  · Choose low-fat dairy products such as skim or 1% milk or low-fat yogurt, cheese, and cottage cheese  · Limit unhealthy fats  such as butter, hard margarine, and shortening  Exercise:  Exercise at least 30 minutes per day on most days of the week  Some examples of exercise include walking, biking, dancing, and swimming  You can also fit in more physical activity by taking the stairs instead of the elevator or parking farther away from stores  Include muscle strengthening activities 2 days each week  Regular exercise provides many health benefits   It helps you manage your weight, and decreases your risk for type 2 diabetes, heart disease, stroke, and high blood pressure  Exercise can also help improve your mood  Ask your healthcare provider about the best exercise plan for you  General health and safety guidelines:   · Do not smoke  Nicotine and other chemicals in cigarettes and cigars can cause lung damage  Ask your healthcare provider for information if you currently smoke and need help to quit  E-cigarettes or smokeless tobacco still contain nicotine  Talk to your healthcare provider before you use these products  · Limit alcohol  A drink of alcohol is 12 ounces of beer, 5 ounces of wine, or 1½ ounces of liquor  · Lose weight, if needed  Being overweight increases your risk of certain health conditions  These include heart disease, high blood pressure, type 2 diabetes, and certain types of cancer  · Protect your skin  Do not sunbathe or use tanning beds  Use sunscreen with a SPF 15 or higher  Apply sunscreen at least 15 minutes before you go outside  Reapply sunscreen every 2 hours  Wear protective clothing, hats, and sunglasses when you are outside  · Drive safely  Always wear your seatbelt  Make sure everyone in your car wears a seatbelt  A seatbelt can save your life if you are in an accident  Do not use your cell phone when you are driving  This could distract you and cause an accident  Pull over if you need to make a call or send a text message  · Practice safe sex  Use latex condoms if are sexually active and have more than one partner  Your healthcare provider may recommend screening tests for sexually transmitted infections (STIs)  · Wear helmets, lifejackets, and protective gear  Always wear a helmet when you ride a bike or motorcycle, go skiing, or play sports that could cause a head injury  Wear protective equipment when you play sports  Wear a lifejacket when you are on a boat or doing water sports    © 2017 [employer] Information is for End User's use only and may not be sold, redistributed or otherwise used for commercial purposes  All illustrations and images included in CareNotes® are the copyrighted property of A D A M [employer]  or Eulogio Armijo  The above information is an  only  It is not intended as medical advice for individual conditions or treatments  Talk to your doctor, nurse or pharmacist before following any medical regimen to see if it is safe and effective for you  Weight Management   AMBULATORY CARE:   Why it is important to manage your weight:  Being overweight increases your risk of health conditions such as heart disease, high blood pressure, type 2 diabetes, and certain types of cancer  It can also increase your risk for osteoarthritis, sleep apnea, and other respiratory problems  Aim for a slow, steady weight loss  Even a small amount of weight loss can lower your risk of health problems  How to lose weight safely:  A safe and healthy way to lose weight is to eat fewer calories and get regular exercise  You can lose up about 1 pound a week by decreasing the number of calories you eat by 500 calories each day  You can decrease calories by eating smaller portion sizes or by cutting out high-calorie foods  Read labels to find out how many calories are in the foods you eat  You can also burn calories with exercise such as walking, swimming, or biking  You will be more likely to keep weight off if you make these changes part of your lifestyle  Healthy meal plan for weight management:  A healthy meal plan includes a variety of foods, contains fewer calories, and helps you stay healthy  A healthy meal plan includes the following:  · Eat whole-grain foods more often  A healthy meal plan should contain fiber  Fiber is the part of grains, fruits, and vegetables that is not broken down by your body  Whole-grain foods are healthy and provide extra fiber in your diet   Some examples of whole-grain foods are whole-wheat breads and pastas, oatmeal, brown rice, and bulgur  · Eat a variety of vegetables every day  Include dark, leafy greens such as spinach, kale, billy greens, and mustard greens  Eat yellow and orange vegetables such as carrots, sweet potatoes, and winter squash  · Eat a variety of fruits every day  Choose fresh or canned fruit (canned in its own juice or light syrup) instead of juice  Fruit juice has very little or no fiber  · Eat low-fat dairy foods  Drink fat-free (skim) milk or 1% milk  Eat fat-free yogurt and low-fat cottage cheese  Try low-fat cheeses such as mozzarella and other reduced-fat cheeses  · Choose meat and other protein foods that are low in fat  Choose beans or other legumes such as split peas or lentils  Choose fish, skinless poultry (chicken or turkey), or lean cuts of red meat (beef or pork)  Before you cook meat or poultry, cut off any visible fat  · Use less fat and oil  Try baking foods instead of frying them  Add less fat, such as margarine, sour cream, regular salad dressing and mayonnaise to foods  Eat fewer high-fat foods  Some examples of high-fat foods include french fries, doughnuts, ice cream, and cakes  · Eat fewer sweets  Limit foods and drinks that are high in sugar  This includes candy, cookies, regular soda, and sweetened drinks  Ways to decrease calories:   · Eat smaller portions  ¨ Use a small plate with smaller servings  ¨ Do not eat second helpings  ¨ When you eat at a restaurant, ask for a box and place half of your meal in the box before you eat  ¨ Share an entrée with someone else  · Replace high-calorie snacks with healthy, low-calorie snacks  ¨ Choose fresh fruit, vegetables, fat-free rice cakes, or air-popped popcorn instead of potato chips, nuts, or chocolate  ¨ Choose water or calorie-free drinks instead of soda or sweetened drinks  · Eat regular meals  Skipping meals can lead to overeating later in the day   Eat a healthy snack in place of a meal if you do not have time to eat a regular meal      · Do not shop for groceries when you are hungry  You may be more likely to make unhealthy food choices  Take a grocery list of healthy foods and shop after you have eaten  Exercise:  Exercise at least 30 minutes per day on most days of the week  Some examples of exercise include walking, biking, dancing, and swimming  You can also fit in more physical activity by taking the stairs instead of the elevator or parking farther away from stores  Ask your healthcare provider about the best exercise plan for you  Other things to consider as you try to lose weight:   · Be aware of situations that may give you the urge to overeat, such as eating while watching television  Find ways to avoid these situations  For example, read a book, go for a walk, or do crafts  · Meet with a weight loss support group or friends who are also trying to lose weight  This may help you stay motivated to continue working on your weight loss goals  © 2017 [employer]  Information is for End User's use only and may not be sold, redistributed or otherwise used for commercial purposes  All illustrations and images included in CareNotes® are the copyrighted property of A D A M , Inc  or [employer]  The above information is an  only  It is not intended as medical advice for individual conditions or treatments  Talk to your doctor, nurse or pharmacist before following any medical regimen to see if it is safe and effective for you  Obesity   AMBULATORY CARE:   Obesity  is when your body mass index (BMI) is greater than 30  Your healthcare provider will use your height and weight to measure your BMI  The risks of obesity include  many health problems, such as injuries or physical disability   You may need tests to check for the following:  · Diabetes     · High blood pressure or high cholesterol     · Heart disease · Gallbladder or liver disease     · Cancer of the colon, breast, prostate, liver, or kidney     · Sleep apnea     · Arthritis or gout  Seek care immediately if:   · You have a severe headache, confusion, or difficulty speaking  · You have weakness on one side of your body  · You have chest pain, sweating, or shortness of breath  Contact your healthcare provider if:   · You have symptoms of gallbladder or liver disease, such as pain in your upper abdomen  · You have knee or hip pain and discomfort while walking  · You have symptoms of diabetes, such as intense hunger and thirst, and frequent urination  · You have symptoms of sleep apnea, such as snoring or daytime sleepiness  · You have questions or concerns about your condition or care  Treatment for obesity  focuses on helping you lose weight to improve your health  Even a small decrease in BMI can reduce the risk for many health problems  Your healthcare provider will help you set a weight-loss goal   · Lifestyle changes  are the first step in treating obesity  These include making healthy food choices and getting regular physical activity  Your healthcare provider may suggest a weight-loss program that involves coaching, education, and therapy  · Medicine  may help you lose weight when it is used with a healthy diet and physical activity  · Surgery  can help you lose weight if you are very obese and have other health problems  There are several types of weight-loss surgery  Ask your healthcare provider for more information  Be successful losing weight:   · Set small, realistic goals  An example of a small goal is to walk for 20 minutes 5 days a week  [place] goal is to lose 5% of your body weight  · Tell friends, family members, and coworkers about your goals  and ask for their support  Ask a friend to lose weight with you, or join a weight-loss support group      · Identify foods or triggers that may cause you to overeat , and find ways to avoid them  Remove tempting high-calorie foods from your home and workplace  Place a bowl of fresh fruit on your kitchen counter  If stress causes you to eat, then find other ways to cope with stress  · Keep a diary to track what you eat and drink  Also write down how many minutes of physical activity you do each day  Weigh yourself once a week and record it in your diary  Eating changes: You will need to eat 500 to 1,000 fewer calories each day than you currently eat to lose 1 to 2 pounds a week  The following changes will help you cut calories:  · Eat smaller portions  Use small plates, no larger than 9 inches in diameter  Fill your plate half full of fruits and vegetables  Measure your food using measuring cups until you know what a serving size looks like  · Eat 3 meals and 1 or 2 snacks each day  Plan your meals in advance  Merle Figures and eat at home most of the time  Eat slowly  · Eat fruits and vegetables at every meal   They are low in calories and high in fiber, which makes you feel full  Do not add butter, margarine, or cream sauce to vegetables  Use herbs to season steamed vegetables  · Eat less fat and fewer fried foods  Eat more baked or grilled chicken and fish  These protein sources are lower in calories and fat than red meat  Limit fast food  Dress your salads with olive oil and vinegar instead of bottled dressing  · Limit the amount of sugar you eat  Do not drink sugary beverages  Limit alcohol  Activity changes:  Physical activity is good for your body in many ways  It helps you burn calories and build strong muscles  It decreases stress and depression, and improves your mood  It can also help you sleep better  Talk to your healthcare provider before you begin an exercise program   · Exercise for at least 30 minutes 5 days a week  Start slowly  Set aside time each day for physical activity that you enjoy and that is convenient for you   It is best to do both weight training and an activity that increases your heart rate, such as walking, bicycling, or swimming  · Find ways to be more active  Do yard work and housecleaning  Walk up the stairs instead of using elevators  Spend your leisure time going to events that require walking, such as outdoor festivals or fairs  This extra physical activity can help you lose weight and keep it off  Follow up with your healthcare provider as directed: You may need to meet with a dietitian  Write down your questions so you remember to ask them during your visits  © 2017 [employer]  Information is for End User's use only and may not be sold, redistributed or otherwise used for commercial purposes  All illustrations and images included in CareNotes® are the copyrighted property of [employer]  or Eulogio Armijo  The above information is an  only  It is not intended as medical advice for individual conditions or treatments  Talk to your doctor, nurse or pharmacist before following any medical regimen to see if it is safe and effective for you  Cigarette Smoking and Your Health   AMBULATORY CARE:   Risks to your health if you smoke:  Nicotine and other chemicals found in tobacco damage every cell in your body  Even if you are a light smoker, you have an increased risk for cancer, heart disease, and lung disease  If you are pregnant or have diabetes, smoking increases your risk for complications  Benefits to your health if you stop smoking:   · You decrease respiratory symptoms such as coughing, wheezing, and shortness of breath  · You reduce your risk for cancers of the lung, mouth, throat, kidney, bladder, pancreas, stomach, and cervix  If you already have cancer, you increase the benefits of chemotherapy  You also reduce your risk for cancer returning or a second cancer from developing  · You reduce your risk for heart disease, blood clots, heart attack, and stroke  · You reduce your risk for lung infections, and diseases such as pneumonia, asthma, chronic bronchitis, and emphysema  · Your circulation improves  More oxygen can be delivered to your body  If you have diabetes, you lower your risk for complications, such as kidney, artery, and eye diseases  You also lower your risk for nerve damage  Nerve damage can lead to amputations, poor vision, and blindness  · You improve your body's ability to heal and to fight infections  Benefits to the health of others if you stop smoking:  Tobacco is harmful to nonsmokers who breathe in your secondhand smoke  The following are ways the health of others around you may improve when you stop smoking:  · You lower the risks for lung cancer and heart disease in nonsmoking adults  · If you are pregnant, you lower the risk for miscarriage, early delivery, low birth weight, and stillbirth  You also lower your baby's risk for SIDS, obesity, developmental delay, and neurobehavioral problems, such as ADHD  · If you have children, you lower their risk for ear infections, colds, pneumonia, bronchitis, and asthma  For more information and support to stop smoking:   · [employer]  Phone: 2- 731 - 118-7653  Web Address: www [employer]  Follow up with your healthcare provider as directed:  Write down your questions so you remember to ask them during your visits  © 2017 [employer] Information is for End User's use only and may not be sold, redistributed or otherwise used for commercial purposes  All illustrations and images included in CareNotes® are the copyrighted property of [employer] M [employer]  or Eulogio Armijo  The above information is an  only  It is not intended as medical advice for individual conditions or treatments  Talk to your doctor, nurse or pharmacist before following any medical regimen to see if it is safe and effective for you      Cholesterol and Your Health AMBULATORY CARE:   Cholesterol  is a waxy, fat-like substance  Cholesterol is made by your body, but also comes from certain foods you eat  Your body uses cholesterol to make hormones and new cells  Your body also uses cholesterol to protect nerves  Cholesterol comes from foods such as meat and dairy products  Your total cholesterol level is made up by LDL cholesterol, HDL cholesterol, and triglycerides:  · LDL cholesterol  is called bad cholesterol  because it forms plaque in your arteries  As plaque builds up, your arteries become narrow, and less blood flows through  When plaque decreases blood flow to your heart, you may have chest pain  If plaque completely blocks an artery that bring blood to your heart, you may have a heart attack  Plaque can break off and form blood clots  Blood clots may block arteries in your brain and cause a stroke  · HDL cholesterol  is called good cholesterol  because it helps remove LDL cholesterol from your arteries  It does this by attaching to LDL cholesterol and carrying it to your liver  Your liver breaks down LDL cholesterol so your body can get rid of it  High levels of HDL cholesterol can help prevent a heart attack and stroke  Low levels of HDL cholesterol can increase your risk for heart disease, heart attack, and stroke  · Triglycerides  are a type of fat that store energy from foods you eat  High levels of triglycerides also cause plaque buildup  This can increase your risk for a heart attack or stroke  If your triglyceride level is high, your LDL cholesterol level may also be high  How food affects your cholesterol levels:   · Unhealthy fats  increase LDL cholesterol and triglyceride levels in your blood  They are found in foods high in cholesterol, saturated fat, and trans fat:     ¨ Cholesterol  is found in eggs, dairy, and meat  ¨ Saturated fat  is found in butter, cheese, ice cream, whole milk, and coconut oil   Saturated fat is also found in meat, such as sausage, hot dogs, and bologna  ¨ Trans fat  is found in liquid oils and is used in fried and baked foods  Foods that contain trans fats include chips, crackers, muffins, sweet rolls, microwave popcorn, and cookies  · Healthy fats,  also called unsaturated fats, help lower LDL cholesterol and triglyceride levels  Healthy fats include the following:     ¨ Monounsaturated fats  are found in foods such as olive oil, canola oil, avocado, nuts, and olives  ¨ Polyunsaturated fats,  such as omega 3 fats, are found in fish, such as salmon, trout, and tuna  They can also be found in plant foods such as flaxseed, walnuts, and soybeans  Other things that affect your cholesterol levels:   · Smoking cigarettes    · Being overweight or obese     · Drinking large amounts of alcohol    · Not enough exercise or no exercise    · Certain genes passed from your parents to you  What you need to know about having your cholesterol levels checked: Adults 21to 39years of age should have their cholesterol levels checked every 4 to 6 years  Adults 45 years and older should have their cholesterol checked every 1 to 2 years  You may need your cholesterol checked more often, or at a younger age, if you have risk factors for heart disease  You may also need to have your cholesterol checked more often if you have other health conditions, such as diabetes  Blood tests are used to check cholesterol levels  Blood tests measure your levels of triglycerides, LDL cholesterol, and HDL cholesterol  Cholesterol level goals: Your cholesterol level goal may depend on your risk for heart disease  It may also depend on your age and other health conditions  Ask your healthcare provider if the following goals are right for you:  · Your total cholesterol level  should be less than 200 mg/dL  This number may also depend on your HDL and LDL cholesterol goals  · Your LDL cholesterol level  should be less than 130 mg/dL       · Your HDL cholesterol level  should be 60 mg/dL or higher  · Your triglyceride level  should be less than 150 mg/dL  Treatment for high cholesterol:  Treatment for high cholesterol will also decrease your risk of heart disease, heart attack, and stroke  Treatment may include any of the following:  · Medicines  may be given to lower your LDL cholesterol, triglyceride levels, or total cholesterol level  You may need medicines to lower your cholesterol if any of the following is true:     ¨ You have a history of stroke, TIA, unstable angina, or a heart attack    ¨ Your LDL cholesterol level is 190 mg/dL or higher    ¨ You are age 36to 76years of age, have diabetes, and your LDL cholesterol is 70 mg/dL or higher    ¨ You are age 36to 76years of age, have risk factors for heart disease, and your LDL cholesterol is 70 mg/dL or higher    · Lifestyle changes  include changes to your diet, exercise, weight loss, and quitting smoking  It also includes decreasing the amount of alcohol you drink  · Supplements  include fish oil, red yeast rice, and garlic  Fish oil may help lower your triglyceride and LDL cholesterol levels  It may also increase your HDL cholesterol level  Red yeast rice may help decrease your total cholesterol level and LDL cholesterol level  Garlic may help lower your total cholesterol level  Do not take these supplements without talking to your healthcare provider  Nutrition to help lower your cholesterol levels:  A registered dietitian can help you create a healthy eating plan  Read food labels and choose foods low in saturated fat, trans fats, and cholesterol  · Decrease the total amount of fat you eat  Choose lean meats, fat-free or 1% fat milk, and low-fat dairy products, such as yogurt and cheese  Try to limit or avoid red meats  Limit or do not eat fried foods or baked goods such as cookies  · Replace unhealthy fats with healthy fats  Cook foods in olive oil or canola oil   Choose soft margarines that are low in saturated fat and trans fat  Seeds, nuts, and avocados are other examples of healthy fats  · Eat foods with omega-3 fats  Examples include salmon, tuna, mackerel, walnuts, and flaxseed  Eat fish 2 times per week  Children and pregnant women should not eat fish that have high levels of mercury, such as shark, swordfish, and eulalio mackerel  · Increase the amount of plant-based foods you eat  Plant-based foods are low in cholesterol and fat  Eating more of these foods may help lower your cholesterol and help you lose weight  Examples of plant-based foods includes fruits, vegetables, legumes, and whole grains  Replace milk that contains dairy with almond, soy, or coconut milk  Eat beans and foods with soy for protein instead of meat  Ask your healthcare provider or dietitian for more information on plant-based foods  · Increase the amount of fiber you eat  High-fiber foods can help lower your LDL cholesterol  You should eat between 20 and 30 grams of fiber each day  Eat at least 5 servings of fruits and vegetables each day  Other examples of high-fiber foods include whole-grain or whole-wheat breads, pastas, or cereals, and brown rice  Eat 3 ounces of whole-grain foods each day  Increase fiber slowly  You may have abdominal discomfort, bloating, and gas if you add fiber to your diet too quickly  Lifestyle changes you can make to help lower your cholesterol levels:   · Maintain a healthy weight  Ask your healthcare provider how much you should weigh  Ask him or her to help you create a weight loss plan if you are overweight  Weight loss can decrease your total cholesterol and triglyceride levels  · Exercise regularly  Exercise can help lower your total cholesterol level and maintain a healthy weight  Exercise can also help increase your HDL cholesterol level  Work with your healthcare provider to create an exercise program that is right for you   Get at least 30 minutes of moderate exercise most days of the week  Examples of exercise include brisk walking, swimming, or biking  · Do not smoke  Nicotine and other chemicals in cigarettes and cigars can damage your lungs, heart, and blood vessels  They can also raise your triglyceride levels  Ask your healthcare provider for information if you currently smoke and need help to quit  E-cigarettes or smokeless tobacco still contain nicotine  Talk to your healthcare provider before you use these products  · Limit or do not drink alcohol  Alcohol can increase your triglyceride levels  Ask your healthcare provider if it is safe for you to drink alcohol  Also ask how much is safe for you to drink each day  © 2017 [employer] Information is for End User's use only and may not be sold, redistributed or otherwise used for commercial purposes  All illustrations and images included in CareNotes® are the copyrighted property of [employer] M [employer]  or Eulogio Armijo  The above information is an  only  It is not intended as medical advice for individual conditions or treatments  Talk to your doctor, nurse or pharmacist before following any medical regimen to see if it is safe and effective for you  Hypertension   WHAT YOU NEED TO KNOW:   What is hypertension? Hypertension is high blood pressure (BP)  Your BP is the force of your blood moving against the walls of your arteries  Normal BP is less than 120/80  Prehypertension is between 120/80 and 139/89  Hypertension is 140/90 or higher  Hypertension causes your BP to get so high that your heart has to work much harder than normal  This can damage your heart  You can control hypertension with a healthy lifestyle or medicines  A controlled blood pressure helps protect your organs, such as your heart, lungs, brain, and kidneys  What causes hypertension? The cause of hypertension may not be known   This type of hypertension is called essential or primary hypertension  Hypertension can sometimes be caused by other medical conditions, such as kidney disease  This type of hypertension is called secondary hypertension  What increases my risk for hypertension? · Age older than 54 years (men)    · Age older than 72 years (women)    · A family history of hypertension or heart disease     · Obesity or lack of exercise     · Too many high-sodium foods    · Stress     · Use of tobacco, alcohol, or illegal drugs     · A medical condition, such as diabetes, kidney disease, thyroid disease, or adrenal gland disorder     · Certain medicines, such as steroids or birth control pills  What are the signs and symptoms of hypertension? You may have no signs or symptoms, or you may have any of the following:  · Headache     · Blurred vision     · Chest pain     · Dizziness or weakness     · Trouble breathing    · Nosebleeds  How is hypertension diagnosed? Your healthcare provider will ask about your symptoms and the medicines you take  He or she will also ask if you have a family history of high blood pressure and about any health conditions you have  He or she will also check your blood pressure and weight and examine your heart, lungs, and eyes  You may need any of the following tests:  · Blood tests  may help healthcare providers find the cause of your hypertension  Blood tests can also help find other health problems caused by hypertension  · Urine tests  will be done to check your kidney function  Kidney problems can increase your risk for hypertension  How is hypertension treated? Your healthcare provider will recommend lifestyle changes to lower your BP  You may also need the following medicines:  · Medicine  may be used to help lower your BP  You may need more than one type of medicine  Take the medicine exactly as directed  · Diuretics  help decrease extra fluid that collects in your body  This will help lower your BP   You may urinate more often while you take this medicine  · Cholesterol medicine  helps lower your cholesterol level  A low cholesterol level helps prevent heart disease and makes it easier to control your blood pressure  What can I do to manage hypertension? Talk with your healthcare provider about these and other ways to manage hypertension:  · Check your BP at home  Sit and rest for 5 minutes before you take your BP  Extend your arm and support it on a flat surface  Your arm should be at the same level as your heart  Follow the directions that came with your BP monitor  If possible, take at least 2 BP readings each time  Take your BP at least twice a day at the same times each day, such as morning and evening  Keep a record of your BP readings and bring it to your follow-up visits  Ask your healthcare provider what your BP should be  · Limit sodium (salt) as directed  Too much sodium can affect your fluid balance  Check labels to find low-sodium or no-salt-added foods  Some low-sodium foods use potassium salts for flavor  Too much potassium can also cause health problems  Your healthcare provider will tell you how much sodium and potassium are safe for you to have in a day  He or she may recommend that you limit sodium to 2,300 mg a day  · Follow the meal plan recommended by your healthcare provider  A dietitian or your provider can give you more information on low-sodium plans or the DASH (Dietary Approaches to Stop Hypertension) eating plan  The DASH plan is low in sodium, unhealthy fats, and total fat  It is high in potassium, calcium, and fiber  · Exercise to maintain a healthy weight  Exercise at least 30 minutes per day, on most days of the week  This will help decrease your blood pressure  Ask your healthcare provider about the best exercise plan for you  · Decrease stress  This may help lower your BP  Learn ways to relax, such as deep breathing or listening to music  · Limit alcohol    Women should limit alcohol to 1 drink a day  Men should limit alcohol to 2 drinks a day  A drink of alcohol is 12 ounces of beer, 5 ounces of wine, or 1½ ounces of liquor  · Do not smoke  Nicotine and other chemicals in cigarettes and cigars can increase your BP and also cause lung damage  Ask your healthcare provider for information if you currently smoke and need help to quit  E-cigarettes or smokeless tobacco still contain nicotine  Talk to your healthcare provider before you use these products  · Manage any other health conditions you have  Health conditions such as diabetes can increase your risk for hypertension  Follow your healthcare provider's instructions and take all your medicines as directed  Call 911 for any of the following:   · You have discomfort in your chest that feels like squeezing, pressure, fullness, or pain  · You become confused or have difficulty speaking  · You suddenly feel lightheaded or have trouble breathing  · You have pain or discomfort in your back, neck, jaw, stomach, or arm  When should I seek immediate care? · You have a severe headache or vision loss  · You have weakness in an arm or leg  When should I contact my healthcare provider? · You feel faint, dizzy, confused, or drowsy  · You have been taking your BP medicine and your BP is still higher than your healthcare provider says it should be  · You have questions or concerns about your condition or care  CARE AGREEMENT:   You have the right to help plan your care  Learn about your health condition and how it may be treated  Discuss treatment options with your caregivers to decide what care you want to receive  You always have the right to refuse treatment  The above information is an  only  It is not intended as medical advice for individual conditions or treatments  Talk to your doctor, nurse or pharmacist before following any medical regimen to see if it is safe and effective for you    © 2017 [facility] Information is for End User's use only and may not be sold, redistributed or otherwise used for commercial purposes  All illustrations and images included in CareNotes® are the copyrighted property of [employer]  or Eulogio Armijo  Heart Healthy Diet   AMBULATORY CARE:   A heart healthy diet  is an eating plan low in total fat, unhealthy fats, and sodium (salt)  A heart healthy diet helps decrease your risk for heart disease and stroke  Limit the amount of fat you eat to 25% to 35% of your total daily calories  Limit sodium to less than 2,300 mg each day  Healthy fats:  Healthy fats can help improve cholesterol levels  The risk for heart disease is decreased when cholesterol levels are normal  Choose healthy fats, such as the following:  · Unsaturated fat  is found in foods such as soybean, canola, olive, corn, and safflower oils  It is also found in soft tub margarine that is made with liquid vegetable oil  · Omega-3 fat  is found in certain fish, such as salmon, tuna, and trout, and in walnuts and flaxseed  Unhealthy fats:  Unhealthy fats can cause unhealthy cholesterol levels in your blood and increase your risk of heart disease  Limit unhealthy fats, such as the following:  · Cholesterol  is found in animal foods, such as eggs and lobster, and in dairy products made from whole milk  Limit cholesterol to less than 300 milligrams (mg) each day  You may need to limit cholesterol to 200 mg each day if you have heart disease  · Saturated fat  is found in meats, such as rowland and hamburger  It is also found in chicken or turkey skin, whole milk, and butter  Limit saturated fat to less than 7% of your total daily calories  Limit saturated fat to less than 6% if you have heart disease or are at increased risk for it  · Trans fat  is found in packaged foods, such as potato chips and cookies  It is also in hard margarine, some fried foods, and shortening   Avoid trans fats as much as possible    Heart healthy foods and drinks to include:  Ask your dietitian or healthcare provider how many servings to have from each of the following food groups:  · Grains:      ¨ Whole-wheat breads, cereals, and pastas, and brown rice    ¨ Low-fat, low-sodium crackers and chips    · Vegetables:      ¨ Broccoli, green beans, green peas, and spinach    ¨ Collards, kale, and lima beans    ¨ Carrots, sweet potatoes, tomatoes, and peppers    ¨ Canned vegetables with no salt added    · Fruits:      ¨ Bananas, peaches, pears, and pineapple    ¨ Grapes, raisins, and dates    ¨ Oranges, tangerines, grapefruit, orange juice, and grapefruit juice    ¨ Apricots, mangoes, melons, and papaya    ¨ Raspberries and strawberries    ¨ Canned fruit with no added sugar    · Low-fat dairy products:      ¨ Nonfat (skim) milk, 1% milk, and low-fat almond, cashew, or soy milks fortified with calcium    ¨ Low-fat cheese, regular or frozen yogurt, and cottage cheese    · Meats and proteins , such as lean cuts of beef and pork (loin, leg, round), skinless chicken and turkey, legumes, soy products, egg whites, and nuts  Foods and drinks to limit or avoid:  Ask your dietitian or healthcare provider about these and other foods that are high in unhealthy fat, sodium, and sugar:  · Snack or packaged foods , such as frozen dinners, cookies, macaroni and cheese, and cereals with more than 300 mg of sodium per serving    · Canned or dry mixes  for cakes, soups, sauces, or gravies    · Vegetables with added sodium , such as instant potatoes, vegetables with added sauces, or regular canned vegetables    · Other foods high in sodium , such as ketchup, barbecue sauce, salad dressing, pickles, olives, soy sauce, and miso    · High-fat dairy foods  such as whole or 2% milk, cream cheese, or sour cream, and cheeses     · High-fat protein foods  such as high-fat cuts of beef (T-bone steaks, ribs), chicken or turkey with skin, and organ meats, such as liver    · Cured or smoked meats , such as hot dogs, rowland, and sausage    · Unhealthy fats and oils , such as butter, stick margarine, shortening, and cooking oils such as coconut or palm oil    · Food and drinks high in sugar , such as soft drinks (soda), sports drinks, sweetened tea, candy, cake, cookies, pies, and doughnuts  Other diet guidelines to follow:   · Eat more foods containing omega-3 fats  Eat fish high in omega-3 fats at least 2 times a week  · Limit alcohol  Too much alcohol can damage your heart and raise your blood pressure  Women should limit alcohol to 1 drink a day  Men should limit alcohol to 2 drinks a day  A drink of alcohol is 12 ounces of beer, 5 ounces of wine, or 1½ ounces of liquor  · Choose low-sodium foods  High-sodium foods can lead to high blood pressure  Add little or no salt to food you prepare  Use herbs and spices in place of salt  · Eat more fiber  to help lower cholesterol levels  Eat at least 5 servings of fruits and vegetables each day  Eat 3 ounces of whole-grain foods each day  Legumes (beans) are also a good source of fiber  Lifestyle guidelines:   · Do not smoke  Nicotine and other chemicals in cigarettes and cigars can cause lung and heart damage  Ask your healthcare provider for information if you currently smoke and need help to quit  E-cigarettes or smokeless tobacco still contain nicotine  Talk to your healthcare provider before you use these products  · Exercise regularly  to help you maintain a healthy weight and improve your blood pressure and cholesterol levels  Ask your healthcare provider about the best exercise plan for you  Do not start an exercise program without asking your healthcare provider  Follow up with your healthcare provider as directed:  Write down your questions so you remember to ask them during your visits     © 2017 [employer] Information is for End User's use only and may not be sold, redistributed or otherwise used for commercial purposes  All illustrations and images included in CareNotes® are the copyrighted property of [employer]  or Eulogio Armijo  The above information is an  only  It is not intended as medical advice for individual conditions or treatments  Talk to your doctor, nurse or pharmacist before following any medical regimen to see if it is safe and effective for you  Vitamin D (By mouth)   Vitamin D (VYE-ta-min D)  Maintains calcium and phosphorus in your body and makes your bones strong  This medicine is a vitamin  Brand Name(s): Renate Barfield Vitamin D3, Decara, Delta D3, Dialyvite Vitamin D3 Max, Drisdol, Leader Vitamin D3, [facility] , [employer] Natural Vitamin D, Nature's Blend Super Strength Vitamin D3, Nature's Blend Vitamin D, Nature's Blend Vitamin D3, PharmAssure Vitamin D-3, [employer] Vitamin D-3, [employer]aturals Vitamin D3   There may be other brand names for this medicine  When This Medicine Should Not Be Used: You should not use this medicine if you have had an allergic reaction to vitamin D  How to Use This Medicine:   Tablet, Liquid Filled Capsule  · Your doctor will tell you how much medicine to use  Do not use more than directed  · Follow the instructions on the medicine label if you are using this medicine without a prescription  · It is best to take this medicine with food or milk  · Carefully follow your doctor's instructions about any special diet  If a dose is missed:   · Take a dose as soon as you remember  If it is almost time for your next dose, wait until then and take a regular dose  Do not take extra medicine to make up for a missed dose  How to Store and Dispose of This Medicine:   · Store the medicine in a closed container at room temperature, away from heat, moisture, and direct light    · Ask your pharmacist, doctor, or health caregiver about the best way to dispose of any outdated medicine or medicine no longer needed  · Keep all medicine out of the reach of children  Never share your medicine with anyone  Drugs and Foods to Avoid:   Ask your doctor or pharmacist before using any other medicine, including over-the-counter medicines, vitamins, and herbal products  · Make sure your doctor knows about all other medicines you are using  Warnings While Using This Medicine:   · Make sure your doctor knows if you are pregnant or breast feeding  · Make sure your doctor knows if you have kidney stones, sarcoidosis, or overactive parathyroid gland  · You should not use this medicine if you are under 25years of age  Possible Side Effects While Using This Medicine:   Call your doctor right away if you notice any of these side effects:  · Allergic reaction: Itching or hives, swelling in your face or hands, swelling or tingling in your mouth or throat, chest tightness, trouble breathing  · Change in how much or how often you urinate  If you notice these less serious side effects, talk with your doctor:   · Diarrhea  · Headache  · Weight loss  If you notice other side effects that you think are caused by this medicine, tell your doctor  Call your doctor for medical advice about side effects  You may report side effects to FDA at 8-719-FDA-6859  © 2017 [employer] [employer] Information is for End User's use only and may not be sold, redistributed or otherwise used for commercial purposes  The above information is an  only  It is not intended as medical advice for individual conditions or treatments  Talk to your doctor, nurse or pharmacist before following any medical regimen to see if it is safe and effective for you  Vitamin D Deficiency   WHAT YOU NEED TO KNOW:   What is vitamin D deficiency? Vitamin D deficiency is a low level of vitamin D in your body  Vitamin D helps your body absorb calcium from foods  Your body makes vitamin D when your skin is exposed to sunlight   You can also get vitamin D from certain foods such as fish, eggs, and meat  Most of the vitamin D in your body comes from sunlight exposure  What increases my risk for vitamin D deficiency? · Low amount of sun exposure    · Low intake of foods that contain vitamin D    · Having dark skin    · Age older than 72    · Pregnancy or breastfeeding    · Infants who are     · Inability to absorb vitamin D from food    · Obesity    · Use of certain medicines such as antiseizure, steroid, or antifungal medicines  What are the signs and symptoms of vitamin D deficiency? Low levels of vitamin D can lead to weak and brittle bones that are more likely to fracture  You may not have any signs and symptoms, or you may have any of the following:  · Bone pain or discomfort in your lower back, pelvis, or legs    · Muscle aches and weakness    · Low back pain in women    · Poor growth, irritability, and frequent respiratory tract infections in infants    · Deformed bones and slow growth in children  How is vitamin D deficiency diagnosed and treated? Blood tests will be done to measure the amount of vitamin D in your blood  Your healthcare provider may give you high doses of vitamin D for 8 to 12 weeks to increase your levels  Your levels will then be rechecked  If your levels are still low, you will need to take vitamin D supplements for another 8 weeks  After your levels have gone back to normal, you may need to continue to take a vitamin D supplement  How much vitamin D do I need each day? The amount of vitamin D you need depends on your age  You may need more than the recommended amounts below if you take certain medicines or you are obese  Ask your healthcare provider how much vitamin D you need    · Infants up to 1 year of age: 0 international units (IU)    · Children 1 year and older: 600 IU    · Adults aged 23to 79years old: 600 IU    · Adults older than 70 years: 800 IU  How can I help prevent vitamin D deficiency? · Eat foods that are high in vitamin D  Fatty fish such as mackerel, canned tuna and sardines, and salmon are good sources of vitamin D  Certain foods such as milk, juice, and cereal are fortified with vitamin D      · Give your  infant a vitamin D supplement  of 400 IU each day  · Take vitamin D supplements as directed  High doses of vitamin D can be toxic  Your healthcare provider will tell you how much vitamin D you should take each day  Vitamin D is best absorbed when taken with food  · Expose your skin to sunlight as directed  Ask your healthcare provider how you can safely expose your skin to sunlight and for how long  Too much exposure to sunlight can cause skin cancer  When should I contact my healthcare provider? · You or your child still have symptoms, or your symptoms get worse  · You think you took too much of a vitamin D supplement, and you have nausea, vomiting, or a headache  · You have questions or concerns about your condition or care  CARE AGREEMENT:   You have the right to help plan your care  Learn about your health condition and how it may be treated  Discuss treatment options with your caregivers to decide what care you want to receive  You always have the right to refuse treatment  The above information is an  only  It is not intended as medical advice for individual conditions or treatments  Talk to your doctor, nurse or pharmacist before following any medical regimen to see if it is safe and effective for you  © 2017 [employer] Information is for End User's use only and may not be sold, redistributed or otherwise used for commercial purposes  All illustrations and images included in CareNotes® are the copyrighted property of A D [employer] M [employer] Inc  or Eulogio Armijo  Prediabetes   WHAT YOU NEED TO KNOW:   What is prediabetes?   Prediabetes is a blood glucose level that is higher than normal  It is not high enough to be considered diabetes  Prediabetes increases your risk for type 2 diabetes and heart disease  What increases my risk for prediabetes? · Overweight or obesity    · Lack of physical activity    · Older age    · Family history of diabetes (parent or sibling)    · A history of heart disease, gestational diabetes, or polycystic ovary syndrome     · High blood pressure or cholesterol levels that are not normal    · Being [place] American, , , [place] American, or   What are the symptoms of prediabetes? Prediabetes may not cause any symptoms, or it may cause symptoms similar to diabetes  These may include any of the following:  · More hunger or thirst than usual     · Frequent urination     · Weight loss without trying     · Blurred vision  How is prediabetes diagnosed? You may need tests to check for prediabetes starting at age 39  You may need any of the following:  · An A1c test  shows the average amount of sugar in your blood over the past 2 to 3 months  · A fasting plasma glucose test  is when your blood sugar level is tested after you have not eaten for 8 hours  · A 2-hour plasma glucose test  starts with a blood sugar level check after you have not eaten for 8 hours  You are then given a glucose drink  Your blood sugar level is checked after 2 hours  How is prediabetes managed? Medicine may be given to control your blood sugar levels  Medicine may also be given to lower high blood pressure and high cholesterol  Weight loss and exercise work best to delay or prevent type 2 diabetes  You can decrease your risk of type 2 diabetes by doing the following:  · Lose weight if you are overweight  A weight loss of 7% of your body weight can help to lower your blood sugar level  For example, if you weigh 200 pounds, you should lose 14 pounds  · Exercise regularly  Exercise can help decrease your blood sugar level   It can also help to decrease your risk of heart disease and help you lose weight  Adults should exercise for at least 150 minutes every week  Spread this amount of exercise over at least 3 days a week  Do not skip exercise more than 2 days in a row  Children should exercise for at least 60 minutes on most days of the week  Examples of exercise include walking or swimming  Do not sit for longer than 30 minutes  Work with your healthcare provider to create an exercise plan  · Decrease the amount of calories you eat to help you lose weight  Eat a variety of fruits and vegetables, and eat whole-grain foods more often  Choose dairy foods, meat, and other protein foods that are low in fat  Eat fewer sweets such as candy, cookies, regular soda, and sweetened drinks  You can also decrease calories by eating smaller portion sizes  Work with your healthcare provider or dietitian to develop a meal plan that is right for you  What else can I do to manage my health? · Follow up with your healthcare provider as directed  You will need to return every year to get tested for diabetes  · Do not smoke  Nicotine can damage blood vessels  Do not use e-cigarettes or smokeless tobacco in place of cigarettes or to help you quit  They still contain nicotine  Ask your healthcare provider for information if you currently smoke and need help quitting  When should I contact my healthcare provider? · You have more hunger or thirst than usual      · You are urinating more frequently than normal      · You lose weight without trying  · You have blurred vision  · You have questions or concerns about your condition or care  CARE AGREEMENT:   You have the right to help plan your care  Learn about your health condition and how it may be treated  Discuss treatment options with your caregivers to decide what care you want to receive  You always have the right to refuse treatment  The above information is an  only   It is not intended as medical advice for individual conditions or treatments  Talk to your doctor, nurse or pharmacist before following any medical regimen to see if it is safe and effective for you  © 2017 [employer] Information is for End User's use only and may not be sold, redistributed or otherwise used for commercial purposes  All illustrations and images included in CareNotes® are the copyrighted property of [employer]  [employer]  or Eulogio Armijo  Influenza Virus Vaccine (By injection)   Influenza Virus Vaccine (in-floo-EN-za VYE-stacey VAX-een)  Helps prevent infection with influenza (flu) virus  Brand Name(s): Afluria 6486-8596 Formula, Jing [place] 1540-5754 Formula, FluLaval Quadrivalent 3912-6797 Formula, Fluad 0576-6207 Formula, Fluarix Quadrivalent 3593-4883 Formula, Flublok 7185-2544 Formula, Flucelvax Quadrivalent 8656-0711 Formula, Fluvirin 7641-3941 Formula, Fluzone High-Dose 6119-5672 Formula, Fluzone Intradermal Quadrivalent 4386-8541 Formula, Fluzone Quadrivalent 9053-5782 Formula, Medical Provider Single Use EZ Flu Shot 0834-4113, Medical Provider Single Use EZ Flu Shot 4882-1903   There may be other brand names for this medicine  When This Medicine Should Not Be Used: This vaccine is not right for everyone  You should not receive it if you had an allergic reaction to flu vaccine  If you are allergic to eggs, tell the caregiver who is going to give you the injection  Some brands of this vaccine contain egg proteins and could cause an allergic reaction  How to Use This Medicine:   Injectable  · The vaccine is given as a shot into a muscle or into your skin, usually in the shoulder area  The shot could be given in the thigh for babies and young children  · A nurse or other health provider will give you this medicine  · Read and follow the patient instructions that come with this medicine  Talk to your doctor or pharmacist if you have any questions    · A child who is younger than 5years old and who has not had a flu shot before may need 2 shots  The second shot should be given about 1 month after the first   · Missed dose: Most people need only 1 dose of the vaccine  If your child needs a second dose, it is important for the vaccine to be given on schedule  If you must cancel an appointment, make a new one right away  Drugs and Foods to Avoid:   Ask your doctor or pharmacist before using any other medicine, including over-the-counter medicines, vitamins, and herbal products  · Tell your doctor if you are using a medicine or treatment that weakens your immune system, such as a steroid, radiation, or cancer treatment  This vaccine may not work as well if you are also using these medicines  However, your doctor may still want you to get the vaccine because it can give you some protection  Warnings While Using This Medicine:   · Tell your doctor if you are pregnant or breastfeeding or if you have a weak immune system  · Tell your doctor if you ever had an unusual reaction to a flu shot, such as Guillain-Barré syndrome, or if you are allergic to latex  · The flu vaccine may not protect everyone who receives it  This vaccine will not treat flu symptoms if you have already been infected with the virus  Possible Side Effects While Using This Medicine:   Call your doctor right away if you notice any of these side effects:  · Allergic reaction: Itching or hives, swelling in your face or hands, swelling or tingling in your mouth or throat, chest tightness, trouble breathing  · Fainting, dizziness, or lightheadedness  · Fever over 103 degrees F  · Seizures  · Severe muscle weakness  If you notice these less serious side effects, talk with your doctor:   · Headache, muscle pain, tiredness  · Irritability or crying (in a child)  · Redness, pain, swelling, soreness, or a lump where the shot was given  If you notice other side effects that you think are caused by this medicine, tell your doctor     Call your doctor for medical advice about side effects  You may report side effects to FDA at 0-069-FDA-2129  © 2017 [employer] Information is for End User's use only and may not be sold, redistributed or otherwise used for commercial purposes  The above information is an  only  It is not intended as medical advice for individual conditions or treatments  Talk to your doctor, nurse or pharmacist before following any medical regimen to see if it is safe and effective for you

## 2020-12-10 ENCOUNTER — OFFICE VISIT (OUTPATIENT)
Dept: FAMILY MEDICINE CLINIC | Facility: MEDICAL CENTER | Age: 45
End: 2020-12-10
Payer: COMMERCIAL

## 2020-12-10 VITALS
HEIGHT: 67 IN | OXYGEN SATURATION: 97 % | BODY MASS INDEX: 42.09 KG/M2 | DIASTOLIC BLOOD PRESSURE: 88 MMHG | HEART RATE: 84 BPM | WEIGHT: 268.2 LBS | SYSTOLIC BLOOD PRESSURE: 136 MMHG | TEMPERATURE: 98.4 F

## 2020-12-10 DIAGNOSIS — Z01.818 PRE-OPERATIVE GENERAL PHYSICAL EXAMINATION: ICD-10-CM

## 2020-12-10 DIAGNOSIS — G89.29 CHRONIC PAIN OF RIGHT ELBOW: ICD-10-CM

## 2020-12-10 DIAGNOSIS — Z72.0 TOBACCO ABUSE: ICD-10-CM

## 2020-12-10 DIAGNOSIS — M25.521 CHRONIC PAIN OF RIGHT ELBOW: ICD-10-CM

## 2020-12-10 DIAGNOSIS — I10 ESSENTIAL HYPERTENSION: Primary | ICD-10-CM

## 2020-12-10 DIAGNOSIS — D72.19 EOSINOPHILIC LEUKOCYTOSIS, UNSPECIFIED TYPE: ICD-10-CM

## 2020-12-10 DIAGNOSIS — M21.921 ELBOW DEFORMITY, RIGHT: ICD-10-CM

## 2020-12-10 PROCEDURE — 99214 OFFICE O/P EST MOD 30 MIN: CPT | Performed by: NURSE PRACTITIONER

## 2020-12-10 PROCEDURE — 3075F SYST BP GE 130 - 139MM HG: CPT | Performed by: NURSE PRACTITIONER

## 2020-12-10 PROCEDURE — 3079F DIAST BP 80-89 MM HG: CPT | Performed by: NURSE PRACTITIONER

## 2020-12-10 PROCEDURE — 3008F BODY MASS INDEX DOCD: CPT | Performed by: NURSE PRACTITIONER

## 2020-12-10 PROCEDURE — 4004F PT TOBACCO SCREEN RCVD TLK: CPT | Performed by: NURSE PRACTITIONER

## 2020-12-10 RX ORDER — ROSUVASTATIN CALCIUM 5 MG/1
5 TABLET, COATED ORAL DAILY
COMMUNITY

## 2020-12-16 ENCOUNTER — TELEPHONE (OUTPATIENT)
Dept: FAMILY MEDICINE CLINIC | Facility: MEDICAL CENTER | Age: 45
End: 2020-12-16

## 2020-12-16 PROBLEM — M21.921: Status: ACTIVE | Noted: 2020-12-16

## 2020-12-16 PROBLEM — D72.19 EOSINOPHILIC LEUKOCYTOSIS: Status: ACTIVE | Noted: 2020-12-16

## 2020-12-16 PROBLEM — G89.29 CHRONIC PAIN OF RIGHT ELBOW: Status: ACTIVE | Noted: 2020-12-16

## 2020-12-16 PROBLEM — M25.521 CHRONIC PAIN OF RIGHT ELBOW: Status: ACTIVE | Noted: 2020-12-16

## 2020-12-16 PROBLEM — D72.829 LEUKOCYTOSIS: Status: RESOLVED | Noted: 2017-08-11 | Resolved: 2020-12-16

## 2020-12-16 PROBLEM — Z01.818 PRE-OPERATIVE GENERAL PHYSICAL EXAMINATION: Status: ACTIVE | Noted: 2020-09-21

## 2021-03-28 ENCOUNTER — IMMUNIZATIONS (OUTPATIENT)
Dept: FAMILY MEDICINE CLINIC | Facility: HOSPITAL | Age: 46
End: 2021-03-28

## 2021-03-28 DIAGNOSIS — Z23 ENCOUNTER FOR IMMUNIZATION: Primary | ICD-10-CM

## 2021-03-28 PROCEDURE — 91300 SARS-COV-2 / COVID-19 MRNA VACCINE (PFIZER-BIONTECH) 30 MCG: CPT

## 2021-03-28 PROCEDURE — 0001A SARS-COV-2 / COVID-19 MRNA VACCINE (PFIZER-BIONTECH) 30 MCG: CPT

## 2021-04-18 ENCOUNTER — IMMUNIZATIONS (OUTPATIENT)
Dept: FAMILY MEDICINE CLINIC | Facility: HOSPITAL | Age: 46
End: 2021-04-18

## 2021-04-18 DIAGNOSIS — Z23 ENCOUNTER FOR IMMUNIZATION: Primary | ICD-10-CM

## 2021-04-18 PROCEDURE — 91300 SARS-COV-2 / COVID-19 MRNA VACCINE (PFIZER-BIONTECH) 30 MCG: CPT

## 2021-04-18 PROCEDURE — 0002A SARS-COV-2 / COVID-19 MRNA VACCINE (PFIZER-BIONTECH) 30 MCG: CPT

## 2022-06-28 ENCOUNTER — VBI (OUTPATIENT)
Dept: ADMINISTRATIVE | Facility: OTHER | Age: 47
End: 2022-06-28

## 2023-02-11 ENCOUNTER — OFFICE VISIT (OUTPATIENT)
Dept: URGENT CARE | Facility: MEDICAL CENTER | Age: 48
End: 2023-02-11

## 2023-02-11 VITALS
TEMPERATURE: 98.7 F | HEART RATE: 80 BPM | SYSTOLIC BLOOD PRESSURE: 119 MMHG | DIASTOLIC BLOOD PRESSURE: 56 MMHG | WEIGHT: 286 LBS | BODY MASS INDEX: 44.89 KG/M2 | RESPIRATION RATE: 18 BRPM | HEIGHT: 67 IN | OXYGEN SATURATION: 99 %

## 2023-02-11 DIAGNOSIS — B37.9 CANDIDA ALBICANS INFECTION: ICD-10-CM

## 2023-02-11 DIAGNOSIS — L02.211 ABSCESS OF SKIN OF ABDOMEN: Primary | ICD-10-CM

## 2023-02-11 RX ORDER — NYSTATIN 100000 [USP'U]/G
POWDER TOPICAL 3 TIMES DAILY
Qty: 30 G | Refills: 0 | Status: SHIPPED | OUTPATIENT
Start: 2023-02-11 | End: 2023-02-18

## 2023-02-11 RX ORDER — SULFAMETHOXAZOLE AND TRIMETHOPRIM 800; 160 MG/1; MG/1
1 TABLET ORAL EVERY 12 HOURS SCHEDULED
Qty: 14 TABLET | Refills: 0 | Status: SHIPPED | OUTPATIENT
Start: 2023-02-11 | End: 2023-02-18

## 2023-02-11 NOTE — PROGRESS NOTES
[facility]        NAME: Sharita Sheldon is a 51 y o  female  : 1975    MRN: [id number]  DATE: 2023  TIME: 9:15 AM    Assessment and Plan   Abscess of skin of abdomen [L02 211]  1  Abscess of skin of abdomen  sulfamethoxazole-trimethoprim (BACTRIM DS) 800-160 mg per tablet    Ambulatory Referral to Wound Care      2  Candida albicans infection  nystatin (MYCOSTATIN) powder            Patient Instructions     1  Keep skin clean and dry  2  Apply topical powder to skin 3x daily until resolved  3  Take Bactrim DS 1 tablet twice daily x 7 days  4  Follow-up with wound care  Chief Complaint     Chief Complaint   Patient presents with   • Abscess     Abscess underneath right abdominal fold; patient states its oozing and smells   • Medication Refill     Patient states she is out of celexa 40 mg and klonopin 0 5mg tablets; unable to get into psych appointment until           History of Present Illness       Randell Wilkes a 40-year-old female who presents with a draining lesion in her right groin area that started over the past 3 days  Patient denies any fever, chills or body aches  She has been trying to clean and irrigate the area but it continues to drain  Abscess    Medication Refill        Review of Systems   Review of Systems   Constitutional: Negative  Cardiovascular: Negative  Gastrointestinal: Negative  Skin: Positive for wound           Current Medications       Current Outpatient Medications:   •  nystatin (MYCOSTATIN) powder, Apply topically 3 (three) times a day for 7 days, Disp: 30 g, Rfl: 0  •  sulfamethoxazole-trimethoprim (BACTRIM DS) 800-160 mg per tablet, Take 1 tablet by mouth every 12 (twelve) hours for 7 days, Disp: 14 tablet, Rfl: 0  •  aspirin 81 mg chewable tablet, Chew 81 mg daily, Disp: , Rfl:   •  cholecalciferol (VITAMIN D3) 1,000 units tablet, Take 2 tablets (2,000 Units total) by mouth daily, Disp: 180 tablet, Rfl: 3  •  citalopram (CeleXA) 40 mg tablet, Take 1 tablet (40 mg total) by mouth daily, Disp: 90 tablet, Rfl: 3  •  clonazePAM (KlonoPIN) 0 5 mg tablet, Take 1 tablet (0 5 mg total) by mouth daily, Disp: 30 tablet, Rfl: 1  •  losartan (COZAAR) 50 mg tablet, , Disp: , Rfl:   •  metoprolol succinate (TOPROL-XL) 50 mg 24 hr tablet, Take 1 tablet (50 mg total) by mouth daily, Disp: 30 tablet, Rfl: 11  •  rosuvastatin (CRESTOR) 5 mg tablet, Take 5 mg by mouth daily, Disp: , Rfl:     Current Allergies     Allergies as of 02/11/2023 - Reviewed 02/11/2023   Allergen Reaction Noted   • Buspar [buspirone] Rash and Hives 10/29/2015   • Other Rash 07/26/2017            The following portions of the patient's history were reviewed and updated as appropriate: allergies, current medications, past family history, past medical history, past social history, past surgical history and problem list      Past Medical History:   Diagnosis Date   • Anxiety    • Depression    • Hypertension    • Obesity    • Pulmonary embolism (HCC)    • Right arm fracture    • Tobacco abuse        Past Surgical History:   Procedure Laterality Date   • ADRENAL GLAND SURGERY     • CYST REMOVAL      right side of neck   • HUMERUS FRACTURE SURGERY         Family History   Problem Relation Age of Onset   • Heart failure Mother    • Diabetes Mother    • Cancer Father    • Autism Son          Medications have been verified  Objective   /56   Pulse 80   Temp 98 7 °F (37 1 °C) (Temporal)   Resp 18   Ht 5' 6 5" (1 689 m)   Wt 130 kg (286 lb)   SpO2 99%   BMI 45 47 kg/m²   No LMP recorded  Physical Exam     Physical Exam  Constitutional:       General: She is not in acute distress  Appearance: Normal appearance  She is not ill-appearing  Cardiovascular:      Rate and Rhythm: Normal rate and regular rhythm  Heart sounds: Normal heart sounds  No murmur heard  Pulmonary:      Effort: Pulmonary effort is normal       Breath sounds: Normal breath sounds     Abdominal: General: Abdomen is protuberant  Palpations: Abdomen is soft  Tenderness: There is no abdominal tenderness  Skin:     Comments: Is a 1cm open, draining lesion in the right inguinal area  There is also several large, erythematous patches with surrounding satellite lesions in the lower abdominal and inguinal regions  Neurological:      Mental Status: She is alert  Was cleaned and irrigated with a Betadine and saline wash  Topical antibiotics and dressing were applied

## 2023-02-11 NOTE — PATIENT INSTRUCTIONS
1  Keep skin clean and dry  2  Apply topical powder to skin 3x daily until resolved  3  Take Bactrim DS 1 tablet twice daily x 7 days  4  Follow-up with wound care

## 2023-02-27 ENCOUNTER — VBI (OUTPATIENT)
Dept: ADMINISTRATIVE | Facility: OTHER | Age: 48
End: 2023-02-27

## 2023-07-13 ENCOUNTER — VBI (OUTPATIENT)
Dept: ADMINISTRATIVE | Facility: OTHER | Age: 48
End: 2023-07-13

## 2023-12-08 ENCOUNTER — VBI (OUTPATIENT)
Dept: ADMINISTRATIVE | Facility: OTHER | Age: 48
End: 2023-12-08

## 2023-12-08 NOTE — TELEPHONE ENCOUNTER
12/08/23 7:58 AM     [facility] [employer] SmartForm used to document caregap status.     Stanley Stuart D&C Hysteroscopic Myomectomy with myosure

## 2024-07-16 ENCOUNTER — VBI (OUTPATIENT)
Dept: ADMINISTRATIVE | Facility: OTHER | Age: 49
End: 2024-07-16

## 2024-07-16 NOTE — TELEPHONE ENCOUNTER
07/16/24 1:30 PM     Chart reviewed for Pap Smear (HPV) aka Cervical Cancer Screening ; nothing is submitted to the patient's insurance at this time.     Petra Justice   [facility]

## 2024-08-29 ENCOUNTER — VBI (OUTPATIENT)
Dept: ADMINISTRATIVE | Facility: OTHER | Age: 49
End: 2024-08-29

## 2024-08-29 NOTE — TELEPHONE ENCOUNTER
08/29/24 12:07 PM     Chart reviewed for CRC: Colonoscopy ; nothing is submitted to the patient's insurance at this time.     Petra Justice   [facility]

## 2024-09-19 ENCOUNTER — OFFICE VISIT (OUTPATIENT)
Dept: FAMILY MEDICINE CLINIC | Facility: CLINIC | Age: 49
End: 2024-09-19
Payer: COMMERCIAL

## 2024-09-19 ENCOUNTER — TELEPHONE (OUTPATIENT)
Age: 49
End: 2024-09-19

## 2024-09-19 VITALS
SYSTOLIC BLOOD PRESSURE: 130 MMHG | TEMPERATURE: 97.6 F | HEART RATE: 128 BPM | DIASTOLIC BLOOD PRESSURE: 72 MMHG | OXYGEN SATURATION: 98 % | BODY MASS INDEX: 41.34 KG/M2 | WEIGHT: 263.4 LBS | HEIGHT: 67 IN

## 2024-09-19 DIAGNOSIS — R73.01 ELEVATED FASTING GLUCOSE: ICD-10-CM

## 2024-09-19 DIAGNOSIS — G89.29 CHRONIC PAIN OF RIGHT ELBOW: ICD-10-CM

## 2024-09-19 DIAGNOSIS — Z12.11 SCREEN FOR COLON CANCER: ICD-10-CM

## 2024-09-19 DIAGNOSIS — I10 ESSENTIAL HYPERTENSION: Primary | ICD-10-CM

## 2024-09-19 DIAGNOSIS — Z12.31 ENCOUNTER FOR SCREENING MAMMOGRAM FOR BREAST CANCER: ICD-10-CM

## 2024-09-19 DIAGNOSIS — R06.81 APNEIC EPISODE: ICD-10-CM

## 2024-09-19 DIAGNOSIS — Z12.4 CERVICAL CANCER SCREENING: ICD-10-CM

## 2024-09-19 DIAGNOSIS — B37.9 CANDIDA ALBICANS INFECTION: ICD-10-CM

## 2024-09-19 DIAGNOSIS — E78.5 HYPERLIPIDEMIA, UNSPECIFIED HYPERLIPIDEMIA TYPE: ICD-10-CM

## 2024-09-19 DIAGNOSIS — M25.521 CHRONIC PAIN OF RIGHT ELBOW: ICD-10-CM

## 2024-09-19 PROCEDURE — 99204 OFFICE O/P NEW MOD 45 MIN: CPT | Performed by: FAMILY MEDICINE

## 2024-09-19 RX ORDER — LOSARTAN POTASSIUM 50 MG/1
50 TABLET ORAL DAILY
Qty: 100 TABLET | Refills: 2 | Status: SHIPPED | OUTPATIENT
Start: 2024-09-19

## 2024-09-19 RX ORDER — METOPROLOL SUCCINATE 50 MG/1
50 TABLET, EXTENDED RELEASE ORAL 2 TIMES DAILY
Qty: 180 TABLET | Refills: 1 | Status: SHIPPED | OUTPATIENT
Start: 2024-09-19

## 2024-09-19 RX ORDER — NYSTATIN 100000 [USP'U]/G
POWDER TOPICAL 3 TIMES DAILY
Qty: 30 G | Refills: 0 | Status: SHIPPED | OUTPATIENT
Start: 2024-09-19 | End: 2024-09-26

## 2024-09-19 RX ORDER — ROSUVASTATIN CALCIUM 5 MG/1
5 TABLET, COATED ORAL DAILY
Qty: 100 TABLET | Refills: 2 | Status: SHIPPED | OUTPATIENT
Start: 2024-09-19

## 2024-09-19 NOTE — PROGRESS NOTES
Ambulatory Visit  Name: Irais Lane      : 1975      MRN: [id number]  Encounter Provider: Marcus Flowers MD  Encounter Date: 2024   Encounter department: [facility]    Assessment & Plan  Essential hypertension  Blood pressure goal, 130/72, will refill metoprolol and losartan  Screen for colon cancer  Orders:    Ambulatory Referral to Gastroenterology; Future    Encounter for screening mammogram for breast cancer  Orders:    Mammo screening bilateral w 3d and cad; Future    BMI 40.0-44.9, adult (HCC)  Patient has limited capacity for exercise due to chronic pain  Chronic pain of right elbow         Candida albicans infection    Orders:    nystatin (MYCOSTATIN) powder; Apply topically 3 (three) times a day for 7 days    Elevated fasting glucose  Will obtain blood work for eval     Orders:    Hemoglobin A1C; Future    Hyperlipidemia, unspecified hyperlipidemia type  Will recheck lipid panel   Restart crestor    Orders:    Lipid panel; Future    Lipid panel    rosuvastatin (CRESTOR) 5 mg tablet; Take 1 tablet (5 mg total) by mouth daily    Cervical cancer screening    Orders:    Ambulatory Referral to Obstetrics / Gynecology; Future    Apneic episode  Referral provided for sleep medicine  Orders:    Ambulatory Referral to Sleep Medicine; Future         History of Present Illness     HPI    Irais is a 49-year-old female patient here to establish care.  Patient does have a history of multiple medical conditions including significant arthritis and joint issues involving her right arm and hand, history of pulmonary embolism, history of DVT, tobacco use, depression anxiety, history of elevated cholesterol and fasting glucose.  Patient had a adrenal adenoma removed in the past.  Patient requesting refill for her medications as well as prescription for her preventive care screening including Pap smear and colonoscopy.  Pt does see a psychiatrist and a therapist who is taking care of her  psychiatric needs.     Uses oxygen at night.     Patient's bilateral PE and acute DVT diagnosed in 2017, was originally on Coumadin and was considering to be switched to Eliquis or Xarelto however patient has not been on anticoagulation medication since.     Review of Systems   Constitutional:  Negative for chills and fever.   HENT:  Negative for congestion, rhinorrhea and sore throat.    Respiratory:  Negative for chest tightness and shortness of breath.    Cardiovascular:  Negative for chest pain.   Gastrointestinal:  Negative for abdominal pain.   Musculoskeletal:  Positive for arthralgias.   Neurological:  Positive for headaches. Negative for dizziness and light-headedness.       Past Medical History:   Diagnosis Date    Anxiety     Depression     Hypertension     Obesity     Pulmonary embolism (HCC)     Right arm fracture     Tobacco abuse      Past Surgical History:   Procedure Laterality Date    ADRENAL GLAND SURGERY      CYST REMOVAL      right side of neck    HUMERUS FRACTURE SURGERY       Family History   Problem Relation Age of Onset    Heart failure Mother     Diabetes Mother     Cancer Father     Autism Son      Social History     Tobacco Use    Smoking status: Every Day     Current packs/day: 0.25     Average packs/day: 0.3 packs/day for 30.0 years (7.5 ttl pk-yrs)     Types: Cigarettes    Smokeless tobacco: Never   Vaping Use    Vaping status: Never Used   Substance and Sexual Activity    Alcohol use: No     Alcohol/week: 0.0 standard drinks of alcohol    Drug use: No    Sexual activity: Not Currently     Partners: Male     Current Outpatient Medications on File Prior to Visit   Medication Sig    cholecalciferol (VITAMIN D3) 1,000 units tablet Take 2 tablets (2,000 Units total) by mouth daily    citalopram (CeleXA) 40 mg tablet Take 1 tablet (40 mg total) by mouth daily    clonazePAM (KlonoPIN) 0.5 mg tablet Take 1 tablet (0.5 mg total) by mouth daily    [DISCONTINUED] losartan (COZAAR) 50 mg tablet   "   [DISCONTINUED] metoprolol succinate (TOPROL-XL) 50 mg 24 hr tablet Take 1 tablet (50 mg total) by mouth daily    aspirin 81 mg chewable tablet Chew 81 mg daily (Patient not taking: Reported on 9/19/2024)    [DISCONTINUED] nystatin (MYCOSTATIN) powder Apply topically 3 (three) times a day for 7 days (Patient not taking: Reported on 9/19/2024)    [DISCONTINUED] rosuvastatin (CRESTOR) 5 mg tablet Take 5 mg by mouth daily (Patient not taking: Reported on 9/19/2024)     Allergies   Allergen Reactions    Buspar [Buspirone] Rash and Hives    Other Rash     Surgical tape  Surgical tape     Immunization History   Administered Date(s) Administered    COVID-19 PFIZER VACCINE 0.3 ML IM 03/28/2021, 04/18/2021    INFLUENZA 10/13/2017, 10/05/2018, 10/16/2019    Influenza, injectable, quadrivalent, preservative free 0.5 mL 09/21/2020    Pneumococcal Polysaccharide PPV23 11/16/2017    Tdap 11/04/2014, 05/27/2016     Objective     /72 (BP Location: Left arm, Patient Position: Sitting, Cuff Size: Standard)   Pulse (!) 128   Temp 97.6 °F (36.4 °C)   Ht 5' 6.5\" (1.689 m)   Wt 119 kg (263 lb 6.4 oz)   SpO2 98%   Breastfeeding No   BMI 41.88 kg/m²     Physical Exam  Vitals reviewed.   Constitutional:       General: She is not in acute distress.     Appearance: Normal appearance. She is obese. She is not ill-appearing, toxic-appearing or diaphoretic.   Cardiovascular:      Rate and Rhythm: Normal rate.      Pulses: Normal pulses.   Pulmonary:      Effort: Pulmonary effort is normal.   Abdominal:      General: Abdomen is flat.   Musculoskeletal:         General: No deformity.      Comments: Right arm range of motion is limited  Persistent flexed state   Skin:     General: Skin is warm and dry.      Capillary Refill: Capillary refill takes less than 2 seconds.      Coloration: Skin is not jaundiced.   Neurological:      General: No focal deficit present.      Mental Status: She is alert and oriented to person, place, and " "time.   Psychiatric:         Mood and Affect: Mood normal.           Marcus Flowers M.D.  Family Medicine    Please excuse any \"sound-alike\" errors that may have ocurred during the process of dictation. Parts of this note have been dictated and there may be errors present in the transcription process. Thank you.    "

## 2024-09-19 NOTE — TELEPHONE ENCOUNTER
Julián, from [facility], called to verify fax number to send a medical necessity form for re certification of the patient's oxygen supplies, required by the patient's insurance.  He will fax the form to [phone].

## 2025-01-23 ENCOUNTER — TELEPHONE (OUTPATIENT)
Dept: OBGYN CLINIC | Facility: MEDICAL CENTER | Age: 50
End: 2025-01-23

## 2025-01-23 ENCOUNTER — APPOINTMENT (OUTPATIENT)
Dept: RADIOLOGY | Facility: MEDICAL CENTER | Age: 50
End: 2025-01-23
Payer: COMMERCIAL

## 2025-01-23 ENCOUNTER — OFFICE VISIT (OUTPATIENT)
Dept: OBGYN CLINIC | Facility: MEDICAL CENTER | Age: 50
End: 2025-01-23
Payer: COMMERCIAL

## 2025-01-23 VITALS — HEIGHT: 66 IN | BODY MASS INDEX: 41.62 KG/M2 | WEIGHT: 259 LBS

## 2025-01-23 DIAGNOSIS — M25.511 ACUTE PAIN OF RIGHT SHOULDER: ICD-10-CM

## 2025-01-23 DIAGNOSIS — S42.351K CLOSED DISPLACED COMMINUTED FRACTURE OF SHAFT OF RIGHT HUMERUS WITH NONUNION: Primary | ICD-10-CM

## 2025-01-23 PROCEDURE — 73030 X-RAY EXAM OF SHOULDER: CPT

## 2025-01-23 PROCEDURE — 99203 OFFICE O/P NEW LOW 30 MIN: CPT | Performed by: STUDENT IN AN ORGANIZED HEALTH CARE EDUCATION/TRAINING PROGRAM

## 2025-01-23 PROCEDURE — 73060 X-RAY EXAM OF HUMERUS: CPT

## 2025-01-23 NOTE — PROGRESS NOTES
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 Presentation most consistent with a non union fracture of the right humerus .  Patient is referred to Dr. Edouard upper extremity surgical specialist vs Trauma surgeon for a surgical consult and further treatment.  CT RUE ordered for potential surgical planning.    Patient will continue taking Motrin as needed   Follow up as needed.      _____________________________________________________  CHIEF COMPLAINT:  Chief Complaint   Patient presents with    Right Shoulder - Pain    Right Elbow - Pain       SUBJECTIVE:  Irais Lane is a 49 y.o. year old female, RHD, who presents for evaluation of right shoulder and elbow pain. Patient has a history of right distal humerus fracture with complex surgical history.     Doesn't provide actual year of initial injury, but had a fall and notes she had initial attempt at nonoperative management in a clamshell brace. Things seemed to not be healing and as such she sought 2nd opinion. She was signed up for surgery by Dr. Goddard, who has since retired. During her first operation she reportedly sustained an iatrogenic fracture after an IM shelby attempted. She was woken up to let her know, then put back to sleep for completion of surgery. She had multiple surgeries since that time, some for continued pain and some for infection. Her most recent surgery by Dr. Goddard on 12/18/2020 consisted of  a right elbow removal of hardware, revision arthrodesis and ulnar nerve neurolysis. Of note, her postop course was also complicated by DVT/PE, but she states she is no longer on anticoagulation.    She feels she healed well post operatively after  last surgery and had no complaints of pain until one month ago when patient had fallen on her right side landing on her right elbow and shoulder. She denies seeking medical care after the fall and states today is her first visit with a physician. She has concern she fractured her arm again as her elbow had zero range of motion from previous fusion, and since the fall she now has motion and is able to reach her mouth with her hand, which she couldn't do before.     She has complaints of pain that radiates from the elbow up into her shoulder.  Patient mentioned baseline paraesthesia from original injury approximately 4.5 years ago. Patient is treating with motrin for symptom relief.     PAST MEDICAL HISTORY:  Past Medical History:   Diagnosis Date    Anxiety     Depression     Hypertension     Obesity     Pulmonary embolism (HCC)     Right arm fracture     Tobacco abuse        PAST SURGICAL HISTORY:  Past Surgical History:   Procedure Laterality Date    ADRENAL GLAND SURGERY      CYST REMOVAL      right side of neck    HUMERUS FRACTURE SURGERY         FAMILY HISTORY:  Family History   Problem Relation Age of Onset    Heart failure Mother     Diabetes Mother     Cancer Father     Autism Son        SOCIAL HISTORY:  Social History     Tobacco Use    Smoking status: Every Day     Current packs/day: 0.25     Average packs/day: 0.3 packs/day for 30.0 years (7.5 ttl pk-yrs)     Types: Cigarettes    Smokeless tobacco: Never   Vaping Use    Vaping status: Never Used   Substance Use Topics    Alcohol use: No     Alcohol/week: 0.0 standard drinks of alcohol    Drug use: No       MEDICATIONS:    Current Outpatient Medications:     aspirin 81 mg chewable tablet, Chew 81 mg daily, Disp: , Rfl:     clonazePAM (KlonoPIN) 0.5 mg tablet, Take 1 tablet (0.5 mg total) by mouth daily, Disp: 30 tablet, Rfl: 1    losartan (COZAAR) 50 mg tablet, Take 1 tablet (50 mg total) by mouth daily, Disp: 100 tablet, Rfl: 2    metoprolol succinate  "(TOPROL-XL) 50 mg 24 hr tablet, Take 1 tablet (50 mg total) by mouth 2 (two) times a day, Disp: 180 tablet, Rfl: 1    rosuvastatin (CRESTOR) 5 mg tablet, Take 1 tablet (5 mg total) by mouth daily, Disp: 100 tablet, Rfl: 2    cholecalciferol (VITAMIN D3) 1,000 units tablet, Take 2 tablets (2,000 Units total) by mouth daily, Disp: 180 tablet, Rfl: 3    citalopram (CeleXA) 40 mg tablet, Take 1 tablet (40 mg total) by mouth daily, Disp: 90 tablet, Rfl: 3    nystatin (MYCOSTATIN) powder, Apply topically 3 (three) times a day for 7 days, Disp: 30 g, Rfl: 0    ALLERGIES:  Allergies   Allergen Reactions    Buspar [Buspirone] Rash and Hives    Other Rash     Surgical tape  Surgical tape       Review of systems:   Constitutional: Negative for fatigue, fever or loss of apetite.   HENT: Negative.    Respiratory: Negative for shortness of breath, dyspnea.    Cardiovascular: Negative for chest pain/tightness.   Gastrointestinal: Negative for abdominal pain, N/V.   Endocrine: Negative for cold/heat intolerance, unexplained weight loss/gain.   Genitourinary: Negative for flank pain, dysuria, hematuria.   Musculoskeletal: As in HPI  Skin: Negative for rash.    Neurological: Subjective for numbness or tingling  Psychiatric/Behavioral: Negative for agitation.  _____________________________________________________  PHYSICAL EXAMINATION:    Height 5' 6\" (1.676 m), weight 117 kg (259 lb), not currently breastfeeding.    General: well developed, well nourished, alert and oriented times 3, no acute distress  HEENT: Benign, normocephalic, atraumatic  Cardiovascular: normal hr    Pulmonary: No wheezing or stridor  Abdomen: Soft, Nontender  Skin: No open wounds, erythema, rash  Neurovascular: per examination below    MUSCULOSKELETAL EXAMINATION:     Right elbow exam  Obese body habitus  Prominence to posterior arm consistent with deformity    Healed incision sites  Significantly limited range of motion at elbow joint due to fusion, however " there is motion through distal humerus   Tenderness to distal humerus   Forms full composite fist  Neurovascularly intact distally  2+ Radial pulse    _____________________________________________________  STUDIES REVIEWED:  I have personally reviewed pertinent films in PACS and my interpretation is:     X-ray's of right humerus 1/23/2025 demonstrates hardware failure of elbow spanning plate with broken screws and loss of fixation in proximal fragment with greater angulation through site of prior nonunion that was visible to prior Xrs in our system from 2020.     Scribe Attestation      I,:  Katia Pearson am acting as a scribe while in the presence of the attending physician.:       I,:  Daron Tee MD personally performed the services described in this documentation    as scribed in my presence.:

## 2025-01-28 ENCOUNTER — HOSPITAL ENCOUNTER (OUTPATIENT)
Dept: CT IMAGING | Facility: HOSPITAL | Age: 50
Discharge: HOME | End: 2025-01-28
Attending: STUDENT IN AN ORGANIZED HEALTH CARE EDUCATION/TRAINING PROGRAM
Payer: COMMERCIAL

## 2025-01-28 DIAGNOSIS — S42.351K CLOSED DISPLACED COMMINUTED FRACTURE OF SHAFT OF RIGHT HUMERUS WITH NONUNION: ICD-10-CM

## 2025-01-28 PROCEDURE — 73200 CT UPPER EXTREMITY W/O DYE: CPT

## 2025-01-29 ENCOUNTER — OFFICE VISIT (OUTPATIENT)
Dept: OBGYN CLINIC | Facility: CLINIC | Age: 50
End: 2025-01-29
Payer: COMMERCIAL

## 2025-01-29 VITALS — HEIGHT: 66 IN | WEIGHT: 259 LBS | BODY MASS INDEX: 41.62 KG/M2

## 2025-01-29 DIAGNOSIS — S42.351K CLOSED DISPLACED COMMINUTED FRACTURE OF SHAFT OF RIGHT HUMERUS WITH NONUNION: ICD-10-CM

## 2025-01-29 PROCEDURE — 99215 OFFICE O/P EST HI 40 MIN: CPT | Performed by: ORTHOPAEDIC SURGERY

## 2025-01-29 NOTE — PROGRESS NOTES
[facility] [address] [address] [phone]       Irais Lane  [id number]  1975    ORTHOPAEDIC SURGERY OUTPATIENT NOTE  1/29/2025      HISTORY:  49 y.o. female  presents today evaluation for her right elbow. She was referred by Dr. Tee. Patient has history of right distal humerus fracture with complex surgical history. Patients most recent surgery was  by Dr. Goddard on 12/18/2020 consisted of  a right elbow removal of hardware, revision arthrodesis and ulnar nerve neurolysis. Her initial injury was in 2017 where she slipped on ice and fell directly onto her right elbow.  she had initial [facility]operative [facility].. Things seemed to not be healing and as such she sought 2nd opinion. She was signed up for surgery by Dr. Goddard, who has since retired. During her first operation she reportedly sustained an iatrogenic fracture after an IM shelby attempted. She was woken up to let her know, then put back to sleep for completion of surgery. She had multiple surgeries since that time, some for continued pain and some for infection. Patient states she had a total of 8 surgeries with Dr. Goddard at that time was [facility].     Patient states she had a fall a month ago where she slipped and fell on ice and landed on her right side. She was seen by Dr. Tee had XR right elbow which showed mild AC joint arthrosis and XR right shoulder which showed Increasing angulation and displacement at the previously noted distal humerus fracture nonunion in the setting of stable extensive multi plate and screw fixation. Patient states she is having pain distal humerus . She notes pain with lifting the arm. She denies numbness or tingling.    Patient is  smoker and smokes 1/2 pack per day. She has history of left lower extremity DVT and bilateral pulmonary embolism.      Past Medical History:   Diagnosis Date    Anxiety     Depression     Hypertension     Obesity     Pulmonary embolism (HCC)     Right arm fracture     Tobacco abuse        Past Surgical History:   Procedure Laterality Date    ADRENAL GLAND SURGERY      CYST REMOVAL      right side of neck    HUMERUS FRACTURE SURGERY         Social History     Socioeconomic History    Marital status: Single     Spouse name: Not on file    Number of children: 5    Years of education: Not on file    Highest education level: Not on file   Occupational History    Occupation: unemployed   Tobacco Use    Smoking status: Every Day     Current packs/day: 0.25     Average packs/day: 0.3 packs/day for 30.0 years (7.5 ttl pk-yrs)     Types: Cigarettes    Smokeless tobacco: Never   Vaping Use    Vaping status: Never Used   Substance and Sexual Activity    Alcohol use: No     Alcohol/week: 0.0 standard drinks of alcohol    Drug use: No    Sexual activity: Not Currently     Partners: Male   Other Topics Concern    Not on file   Social History Narrative    · Do you currently or have you served in the [email or web address] Forces:   No      · Were you activated, into active duty, as a member of the [employer] or as a Reservist:   No      Social Drivers of Health     Financial Resource Strain: Medium Risk (8/3/2020)    Overall Financial Resource Strain (CARDIA)     Difficulty of Paying Living Expenses: Somewhat hard   Food Insecurity: Food Insecurity Present (8/3/2020)    Hunger Vital Sign     Worried About Running Out of Food in the Last Year: Often true     Ran Out of Food in the Last Year: Often true   Transportation Needs: No Transportation Needs (8/3/2020)    PRAPARE - Transportation     Lack of Transportation (Medical): No     Lack of Transportation (Non-Medical): No   Physical Activity: Insufficiently Active (8/3/2020)    Exercise Vital Sign     Days of Exercise per Week: 4 days     Minutes of Exercise per Session: 30 min   Stress: Stress Concern  Present (8/3/2020)    [place] [employer] - Occupational Stress Questionnaire     Feeling of Stress : To some extent   Social Connections: Moderately Integrated (8/3/2020)    Social Connection and Isolation Panel [NHANES]     Frequency of Communication with Friends and Family: More than three times a week     Frequency of Social Gatherings with Friends and Family: More than three times a week     Attends Buddhism Services: Never     Active Member of Clubs or Organizations: Yes     Attends Club or Organization Meetings: Never     Marital Status: Living with partner   Intimate Partner Violence: Not At Risk (8/3/2020)    Humiliation, Afraid, Rape, and Kick questionnaire     Fear of Current or Ex-Partner: No     Emotionally Abused: No     Physically Abused: No     Sexually Abused: No   Housing Stability: Not on file       Family History   Problem Relation Age of Onset    Heart failure Mother     Diabetes Mother     Cancer Father     Autism Son         Patient's Medications   New Prescriptions    No medications on file   Previous Medications    ASPIRIN 81 MG CHEWABLE TABLET    Chew 81 mg daily    CHOLECALCIFEROL (VITAMIN D3) 1,000 UNITS TABLET    Take 2 tablets (2,000 Units total) by mouth daily    CITALOPRAM (CELEXA) 40 MG TABLET    Take 1 tablet (40 mg total) by mouth daily    CLONAZEPAM (KLONOPIN) 0.5 MG TABLET    Take 1 tablet (0.5 mg total) by mouth daily    LOSARTAN (COZAAR) 50 MG TABLET    Take 1 tablet (50 mg total) by mouth daily    METOPROLOL SUCCINATE (TOPROL-XL) 50 MG 24 HR TABLET    Take 1 tablet (50 mg total) by mouth 2 (two) times a day    NYSTATIN (MYCOSTATIN) POWDER    Apply topically 3 (three) times a day for 7 days    ROSUVASTATIN (CRESTOR) 5 MG TABLET    Take 1 tablet (5 mg total) by mouth daily   Modified Medications    No medications on file   Discontinued Medications    No medications on file       Allergies   Allergen Reactions    Buspar [Buspirone] Rash and Hives    Other  "Rash     Surgical tape  Surgical tape        Ht 5' 6\" (1.676 m)   Wt 117 kg (259 lb)   BMI 41.80 kg/m²      REVIEW OF SYSTEMS:  Constitutional: Negative.    HEENT: Negative.    Respiratory: Negative.    Skin: Negative.    Neurological: Negative.    Psychiatric/Behavioral: Negative.  Musculoskeletal: Negative except for that mentioned in the HPI.    Gen: No acute distress, resting comfortably in bed  HEENT: Eyes clear, moist mucus membranes, hearing intact  Respiratory: No audible wheezing or stridor  Cardiovascular: Well Perfused peripherally, 2+ distal pulse  Abdomen: nondistended, no peritoneal signs     PHYSICAL EXAM:    Right upper extremity   Multiple surgical scares well healed   Able to move all fingers   Able to extend and flex wrist   NVID     IMAGING:  CT Right elbow demonstrates nonunion of distal humerus fracture, bone cement seen, failure of hardware    ASSESSMENT AND PLAN:  49 y.o. female  with right distal humerus nonunion fracture. Patient with history of right distal humerus fracture with complex surgical history. Patient also is a smoker and has history of bilateral PE and left lower extremity DVT     CT right upper extremity was reviewed in the office today. Discussed with patient due to her extensive surgical history and medical history does not recommend surgery at this time.  Recommends patient to try hinged elbow brace to help with the pain.  Patient agrees and provided patient with a Hounshell elbow brace in the office today.  Discussed with patient if she will need surgery will be more for a free vascularized fibular graft which would be done in conjunction with a vascular surgeon.  Discussed with patient that Dr. Edouard will look into this type of surgery and discussed with his colleagues before contacting the patient.  Will be ordering lab work for TSH, Vit D, PTH, hemoglobin A1C, CRP, ESR, CBC with diff . She is to follow up PRN.       Serene Attestation      I,:  Kj Lorenzo, " MA am acting as a scribe while in the presence of the attending physician.:       I,:  Ryann Edouard, DO personally performed the services described in this documentation    as scribed in my presence.:

## 2025-03-03 ENCOUNTER — TELEPHONE (OUTPATIENT)
Age: 50
End: 2025-03-03

## 2025-03-03 NOTE — TELEPHONE ENCOUNTER
Dalila from [facility] called to see if we have received their fax that was sent last week regarding an O2 script as they need office notes. I confirmed they are using the correct fax number and it will be faxed again. I called  central faxing and left a message to watch for the fax and to send it to us as soon as possible.

## 2025-03-12 ENCOUNTER — TELEPHONE (OUTPATIENT)
Dept: FAMILY MEDICINE CLINIC | Facility: CLINIC | Age: 50
End: 2025-03-12

## 2025-03-12 NOTE — TELEPHONE ENCOUNTER
"Called and LMOM for patient- we received forms from [facility] [place] for oxygen. PCP only saw patient once, order placed for sleep med (patient never saw them). Over due for appt with PCP.     Please schedule patient for physical: Appt note: \"physical/fup. discuss oxygen. Paperwork in yellow folder\"  "

## 2025-04-11 ENCOUNTER — OFFICE VISIT (OUTPATIENT)
Dept: FAMILY MEDICINE CLINIC | Facility: CLINIC | Age: 50
End: 2025-04-11
Payer: COMMERCIAL

## 2025-04-11 VITALS
HEIGHT: 66 IN | OXYGEN SATURATION: 98 % | DIASTOLIC BLOOD PRESSURE: 80 MMHG | BODY MASS INDEX: 41.17 KG/M2 | HEART RATE: 76 BPM | SYSTOLIC BLOOD PRESSURE: 138 MMHG | WEIGHT: 256.2 LBS | TEMPERATURE: 97.7 F

## 2025-04-11 DIAGNOSIS — Z00.00 ANNUAL PHYSICAL EXAM: Primary | ICD-10-CM

## 2025-04-11 DIAGNOSIS — L73.2 HIDRADENITIS SUPPURATIVA: ICD-10-CM

## 2025-04-11 DIAGNOSIS — R91.1 PULMONARY NODULE: ICD-10-CM

## 2025-04-11 DIAGNOSIS — Z72.0 TOBACCO ABUSE: ICD-10-CM

## 2025-04-11 DIAGNOSIS — L98.491 SKIN ULCER, LIMITED TO BREAKDOWN OF SKIN (HCC): ICD-10-CM

## 2025-04-11 PROCEDURE — 99213 OFFICE O/P EST LOW 20 MIN: CPT | Performed by: FAMILY MEDICINE

## 2025-04-11 PROCEDURE — 99396 PREV VISIT EST AGE 40-64: CPT | Performed by: FAMILY MEDICINE

## 2025-04-11 RX ORDER — DOXYCYCLINE 100 MG/1
100 CAPSULE ORAL EVERY 12 HOURS SCHEDULED
Qty: 20 CAPSULE | Refills: 0 | Status: SHIPPED | OUTPATIENT
Start: 2025-04-11 | End: 2025-04-21

## 2025-04-11 NOTE — PROGRESS NOTES
Adult Annual Physical  Name: Irais Lane      : 1975      MRN: [id number]  Encounter Provider: Marcus Flowers MD  Encounter Date: 2025   Encounter department: [facility]    :  Assessment & Plan  Annual physical exam  Pt to complete blood work    Hidradenitis suppurativa  With large ulcer on the right armpit  Will refer to wound care  Start doxycycline for treatment   See media for image     Tobacco abuse  Not ready for tobacco cessation        Skin ulcer, limited to breakdown of skin (HCC)    Orders:    Ambulatory Referral to Wound Care; Future    Pulmonary nodule  Pt has history of pulmonary nodule  Not checked since   Pt agreeable with repeat CT at this time        Preventive Screenings:    - Cholesterol Screening: screening not indicated and has hyperlipidemia   - HIV screening: screening up-to-date   - Lung cancer screening: screening not indicated     Immunizations:  - Immunizations due: Influenza, Prevnar 20 and Zoster (Shingrix)         History of Present Illness     Adult Annual Physical:  Patient presents for annual physical. Pt here for annual physical   State she is frustrated regarding the status of her arm  She continues to be in significant amount of pain and has limited functionality of the arm  Patient use oxygen at night, 98% in room air at this time  Unable to do sleep study due to pain involving the right arm    Patient reports here are boil/furuncle on the right arm pit.     No interested in amputation at this time .     Diet and Physical Activity:  - Diet/Nutrition: poor diet.  - Exercise: no formal exercise. Patient works at flea [employer] and will be on her feet    Depression Screening:    - PHQ-9 Score: 0    General Health:  - Sleep: sleeps poorly and 4-6 hours of sleep on average. Dependa on if patient is in pain  - Hearing: normal hearing right ear and normal hearing left ear.  - Vision: no vision problems.  - Dental: no dental visits for > 1  "year.    /GYN Health:  - Follows with GYN: no.   - Menopause: perimenopausal.   - History of STDs: no    Advanced Care Planning:  - Has an advanced directive?: no    - Has a durable medical POA?: no    - ACP document given to patient?: no      Review of Systems   Constitutional:  Negative for chills and fever.   HENT:  Negative for congestion and rhinorrhea.    Respiratory:  Negative for shortness of breath.    Cardiovascular:  Negative for chest pain.   Gastrointestinal:  Negative for abdominal pain, constipation, diarrhea, nausea and vomiting.   Neurological:  Negative for dizziness, light-headedness and headaches.   Psychiatric/Behavioral:  Negative for sleep disturbance.          Objective   /80 (BP Location: Left arm, Patient Position: Sitting, Cuff Size: Large)   Pulse 76   Temp 97.7 °F (36.5 °C) (Temporal)   Ht 5' 6\" (1.676 m)   Wt 116 kg (256 lb 3.2 oz)   LMP  (Approximate)   SpO2 98%   BMI 41.35 kg/m²     Physical Exam  Vitals reviewed.   Constitutional:       General: She is not in acute distress.     Appearance: Normal appearance. She is obese. She is not toxic-appearing.   Cardiovascular:      Rate and Rhythm: Normal rate.      Pulses: Normal pulses.   Pulmonary:      Effort: Pulmonary effort is normal.   Abdominal:      General: Abdomen is flat.   Musculoskeletal:         General: Deformity present. No swelling.      Comments: Deformity of the right upper arm   Skin:     General: Skin is warm and dry.      Coloration: Skin is not jaundiced.      Comments: Large 1cm ulcer in the right armpit  Possible Hidradenitis suppurativa   Neurological:      Mental Status: She is alert. Mental status is at baseline.   Psychiatric:         Mood and Affect: Mood normal.         Marcus Flowers M.D.  Family Medicine    Please excuse any \"sound-alike\" errors that may have ocurred during the process of dictation. Parts of this note have been dictated and there may be errors present in the transcription process. " Thank you.

## 2025-06-02 ENCOUNTER — VBI (OUTPATIENT)
Dept: ADMINISTRATIVE | Facility: OTHER | Age: 50
End: 2025-06-02

## 2025-06-02 NOTE — TELEPHONE ENCOUNTER
06/02/25 2:10 PM     Chart reviewed for Pap Smear (HPV) aka Cervical Cancer Screening ; nothing is submitted to the patient's insurance at this time.     Albania Gillis MA   [facility]

## 2025-06-05 ENCOUNTER — VBI (OUTPATIENT)
Dept: ADMINISTRATIVE | Facility: OTHER | Age: 50
End: 2025-06-05

## 2025-06-05 NOTE — TELEPHONE ENCOUNTER
06/05/25 11:49 AM     Chart reviewed for CRC: Colonoscopy ; nothing is submitted to the patient's insurance at this time.     Petra Justice   [facility]

## 2025-07-11 ENCOUNTER — TELEPHONE (OUTPATIENT)
Dept: FAMILY MEDICINE CLINIC | Facility: CLINIC | Age: 50
End: 2025-07-11